# Patient Record
Sex: FEMALE | Race: WHITE | URBAN - METROPOLITAN AREA
[De-identification: names, ages, dates, MRNs, and addresses within clinical notes are randomized per-mention and may not be internally consistent; named-entity substitution may affect disease eponyms.]

---

## 2017-01-24 ENCOUNTER — TELEPHONE (OUTPATIENT)
Dept: FAMILY MEDICINE CLINIC | Age: 27
End: 2017-01-24

## 2017-01-24 NOTE — TELEPHONE ENCOUNTER
Pt wants to know if it is possible to have the flu without running a fever because she thinks me might have one. She says she is out of network.

## 2017-01-24 NOTE — TELEPHONE ENCOUNTER
Returned call to pt for clarification. States she has flu-like symptoms, but no fever. Pt asked for appt tmw either with PCP or Dr. Levert Opitz. Dr. Erik Murray out of office, Dr. Levert Opitz booked. Pt agreed to see NP to be tested for flu. States she is \"immune-compromised\" and just wanted to give heads-up. Advised pt's chart is visible to all providers in office for review.

## 2017-01-25 ENCOUNTER — OFFICE VISIT (OUTPATIENT)
Dept: FAMILY MEDICINE CLINIC | Age: 27
End: 2017-01-25

## 2017-01-25 VITALS
DIASTOLIC BLOOD PRESSURE: 74 MMHG | BODY MASS INDEX: 21.52 KG/M2 | OXYGEN SATURATION: 100 % | RESPIRATION RATE: 16 BRPM | TEMPERATURE: 96.6 F | HEART RATE: 89 BPM | WEIGHT: 142 LBS | HEIGHT: 68 IN | SYSTOLIC BLOOD PRESSURE: 119 MMHG

## 2017-01-25 DIAGNOSIS — B34.9 VIRAL SYNDROME: Primary | ICD-10-CM

## 2017-01-25 LAB
S PYO AG THROAT QL: NEGATIVE
VALID INTERNAL CONTROL?: YES

## 2017-01-25 RX ORDER — DOXYCYCLINE 100 MG/1
100 CAPSULE ORAL 2 TIMES DAILY
Qty: 20 CAP | Refills: 0 | Status: SHIPPED | OUTPATIENT
Start: 2017-01-25 | End: 2017-02-04

## 2017-01-25 RX ORDER — OSELTAMIVIR PHOSPHATE 75 MG/1
75 CAPSULE ORAL DAILY
Qty: 5 CAP | Refills: 0 | Status: SHIPPED | OUTPATIENT
Start: 2017-01-25 | End: 2017-01-30

## 2017-01-25 RX ORDER — FLUTICASONE FUROATE AND VILANTEROL 100; 25 UG/1; UG/1
1 POWDER RESPIRATORY (INHALATION) DAILY
COMMUNITY
End: 2017-12-01 | Stop reason: ALTCHOICE

## 2017-01-25 RX ORDER — SUMATRIPTAN 20 MG/1
1 SPRAY NASAL AS NEEDED
COMMUNITY
End: 2018-05-07 | Stop reason: SDUPTHER

## 2017-01-25 NOTE — MR AVS SNAPSHOT
Visit Information Date & Time Provider Department Dept. Phone Encounter #  
 1/25/2017  9:30 AM Yessica Hall, [facility] Gold [phone] [id number] Upcoming Health Maintenance Date Due  
 PAP AKA CERVICAL CYTOLOGY 4/19/2012 HPV AGE 9Y-26Y (2 of 3 - Female 3 Dose Series) 3/23/2016 Pneumococcal 19-64 Highest Risk (3 of 3 - PCV13) 8/22/2017 DTaP/Tdap/Td series (3 - Td) 10/21/2021 Allergies as of 1/25/2017  Review Complete On: 1/25/2017 By: Yessica Hall NP Severity Noted Reaction Type Reactions Amoxicillin High 04/01/2014   Intolerance Hives Levaquin [Levofloxacin] High 01/27/2016   Side Effect Seizures Seizure - lowered seizure threshold Sulfa (Sulfonamide Antibiotics)  10/20/2015    Other (comments) Vision and balance problem Current Immunizations  Reviewed on 12/9/2016 Name Date Hep A Vaccine (Adult) 5/24/2012, 10/21/2011 Hep B, Adol/Ped 4/20/1993, 10/15/1992 IPV 10/21/2011, 4/20/1995 Influenza Vaccine 10/20/2015 10:59 AM  
 Influenza Vaccine PF 10/29/2014  4:04 PM  
 Japanese Encephalitis Vaccine (SQ) 11/23/2011, 10/21/2011 MMR 4/20/1995, 7/20/1992 Meningococcal (MPSV4) Vaccine 11/15/2008 Pneumococcal Polysaccharide (PPSV-23) 8/22/2016, 7/1/2011 Td 10/7/2005 Tdap 10/21/2011 Typhoid Vi Capsular Polysaccharide Vaccine 10/21/2011 Not reviewed this visit You Were Diagnosed With   
  
 Codes Comments Viral syndrome    -  Primary ICD-10-CM: B34.9 ICD-9-CM: 079.99 Vitals BP Pulse Temp Resp Height(growth percentile) Weight(growth percentile) 119/74 (BP 1 Location: Left arm, BP Patient Position: Sitting) 89 96.6 °F (35.9 °C) (Oral) 16 5' 8\" (1.727 m) 142 lb (64.4 kg) LMP SpO2 BMI OB Status Smoking Status 01/11/2017 100% 21.59 kg/m2 Having regular periods Never Smoker BMI and BSA Data  Body Mass Index Body Surface Area  
 21.59 kg/m 2 1.76 m 2  
  
  
 Preferred Pharmacy Pharmacy Name Phone [facility] [phone]  [address] [phone] Your Updated Medication List  
  
   
This list is accurate as of: 1/25/17  9:59 AM.  Always use your most recent med list.  
  
  
  
  
 ADVAIR DISKUS 100-50 mcg/dose diskus inhaler Generic drug:  fluticasone-salmeterol Take 1 Puff by inhalation every twelve (12) hours. albuterol 2 mg tablet Commonly known as:  PROVENTIL Take 2 mg by mouth three (3) times daily. ALLER-MRASHALL PO Take  by mouth. BREO ELLIPTA 100-25 mcg/dose inhaler Generic drug:  fluticasone-vilanterol Take 1 Puff by inhalation daily. CALCIUM 600 + D 600-125 mg-unit Tab Generic drug:  calcium-cholecalciferol (d3) Take  by mouth. cholecalciferol 5,000 unit capsule Commonly known as:  VITAMIN D3 Take 1 Cap by mouth daily. doxycycline 100 mg capsule Commonly known as:  VIBRAMYCIN Take 1 Cap by mouth two (2) times a day for 10 days. folic acid 1 mg tablet Commonly known as:  Google Take  by mouth daily. * LaMICtal XR 50 mg Tr24 ER tablet Generic drug:  lamoTRIgine Take  by mouth. * LaMICtal  mg Tr24 ER tablet Generic drug:  lamoTRIgine Take  by mouth. ondansetron 8 mg disintegrating tablet Commonly known as:  ZOFRAN ODT Take 1 Tab by mouth every twelve (12) hours as needed for Nausea. oseltamivir 75 mg capsule Commonly known as:  TAMIFLU Take 1 Cap by mouth daily for 5 days. raNITIdine 75 mg tablet Commonly known as:  ZANTAC Take 75 mg by mouth two (2) times a day. SUMAtriptan 20 mg/actuation nasal spray Commonly known as:  IMITREX  
1 Spray as needed for Migraine. VIMPAT 200 mg Tab tablet Generic drug:  lacosamide Take 200 mg by mouth two (2) times a day. zonisamide 100 mg capsule Commonly known as:  Staci Hubbard  
 Take 200 mg by mouth nightly. * Notice: This list has 2 medication(s) that are the same as other medications prescribed for you. Read the directions carefully, and ask your doctor or other care provider to review them with you. Prescriptions Sent to Pharmacy Refills  
 oseltamivir (TAMIFLU) 75 mg capsule 0 Sig: Take 1 Cap by mouth daily for 5 days. Class: Normal  
 Pharmacy: [facility] [address] AT [address] [place] [address] Ph #: [phone] Route: Oral  
 doxycycline (VIBRAMYCIN) 100 mg capsule 0 Sig: Take 1 Cap by mouth two (2) times a day for 10 days. Class: Normal  
 Pharmacy: [facility] [address] AT [address] [place] [address] Ph #: [phone] Route: Oral  
  
Patient Instructions Oseltamivir (By mouth) Oseltamivir (kq-zse-QUV-i-vir) Treats and helps prevent influenza. Brand Name(s):Tamiflu There may be other brand names for this medicine. When This Medicine Should Not Be Used: This medicine is not right for everyone. Do not use it if you had an allergic reaction to oseltamivir. How to Use This Medicine:  
Capsule, Liquid · Your doctor will tell you how much medicine to use. Do not use more than directed. Do not take this medicine for any illness other than the flu. · Start taking this medicine as soon as possible after flu symptoms begin or after you are exposed to the flu (within the first 2 days). · Shake the oral liquid before each use. Measure the liquid medicine with the oral dispenser that came with the medicine. Ask your pharmacist for an oral measuring spoon or syringe if you do not have one. · You may open the capsule and mix the contents with a sweet liquid (such as chocolate syrup, corn syrup, or sugar dissolved in water). Ask your doctor or pharmacist if you have any questions. · Read and follow the patient instructions that come with this medicine. Talk to your doctor or pharmacist if you have any questions. · Missed dose: If you miss a dose and your next dose is due within 2 hours, skip the missed dose and take your medicine at the normal time. Do not use extra medicine to make up for a missed dose. If you miss a dose and your next dose is due in more than 2 hours, take the missed dose as soon as you can. Then take your next dose at the normal time, and go back to your regular schedule. · Capsules: Store at room temperature, away from heat, moisture, and direct light. · Oral liquid: Store in the refrigerator and use within 17 days. Do not freeze. You may also store the medicine at room temperature, but use it within 10 days. Throw away any medicine that has not been used within this time. Drugs and Foods to Avoid: Ask your doctor or pharmacist before using any other medicine, including over-the-counter medicines, vitamins, and herbal products. · Do not use this medicine if you have received the live influenza vaccine (nasal mist) in the past 2 weeks or if you will receive the vaccine within 48 hours, unless your doctor says it is okay. Warnings While Using This Medicine: · Tell your doctor if you are pregnant or breastfeeding, or if you have kidney disease, liver disease, heart disease, lung disease, or a weakened immune system. · This medicine may cause the following problems:  
¨ Serious skin reactions ¨ Unusual thoughts or behaviors · Call your doctor if your symptoms do not improve or if they get worse. · The liquid form of this medicine contains sorbitol. Tell your doctor if you have hereditary fructose intolerance. · This medicine is not a substitute for a yearly flu shot. It also will not prevent a bacterial infection. · Keep all medicine out of the reach of children. Never share your medicine with anyone. Possible Side Effects While Using This Medicine: Call your doctor right away if you notice any of these side effects: · Allergic reaction: Itching or hives, swelling in your face or hands, swelling or tingling in your mouth or throat, chest tightness, trouble breathing · Blistering, peeling, red skin rash · Confusion, agitation, seeing or hearing things that are not there, change in mood or behavior, seizures · Fever, chills, cough, sore throat, body aches If you notice these less serious side effects, talk with your doctor: · Nausea, vomiting, diarrhea, stomach pain, or upset stomach If you notice other side effects that you think are caused by this medicine, tell your doctor. Call your doctor for medical advice about side effects. You may report side effects to FDA at 4-926-RPB-2932 © 2016 [address] is for End User's use only and may not be sold, redistributed or otherwise used for commercial purposes. The above information is an  only. It is not intended as medical advice for individual conditions or treatments. Talk to your doctor, nurse or pharmacist before following any medical regimen to see if it is safe and effective for you. Introducing [facility]! Dear Haley Merino: Thank you for requesting a [employer] account. Our records indicate that you already have an active [employer] account. You can access your account anytime at [email or web address] Did you know that you can access your hospital and ER discharge instructions at any time in [employer]? You can also review all of your test results from your hospital stay or ER visit. Additional Information If you have questions, please visit the Frequently Asked Questions section of the [employer] website at [email or web address] Remember, [employer] is NOT to be used for urgent needs. For medical emergencies, dial 911. Now available from your iPhone and Android! Please provide this summary of care documentation to your next provider. Your primary care clinician is listed as Cesar Dumont. If you have any questions after today's visit, please call [phone].

## 2017-01-25 NOTE — PROGRESS NOTES
HISTORY OF PRESENT ILLNESS  Andrea Cummings is a 23 y.o. female. HPI Acute onset of nausea with vomiting once, marked body aches and pain, extreme fatigue and decreased appetite. No treatment to date. Symptom onset acute, timing constant, pain mild to moderate. Past Medical History   Diagnosis Date    Acne vulgaris      Currently on 2nd course of Acutane, sees Dr. 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 [place] [address] 9years old     Absence seizures originally, developed tonic-clonic seizures in 2007; Followed by Dr. Harish Acosta        Current Outpatient Prescriptions:     SUMAtriptan (IMITREX) 20 mg/actuation nasal spray, 1 Spray as needed for Migraine. , Disp: , Rfl:     fluticasone-vilanterol (BREO ELLIPTA) 100-25 mcg/dose inhaler, Take 1 Puff by inhalation daily. , Disp: , Rfl:     oseltamivir (TAMIFLU) 75 mg capsule, Take 1 Cap by mouth daily for 5 days. , Disp: 5 Cap, Rfl: 0    doxycycline (VIBRAMYCIN) 100 mg capsule, Take 1 Cap by mouth two (2) times a day for 10 days. , Disp: 20 Cap, Rfl: 0    ondansetron (ZOFRAN ODT) 8 mg disintegrating tablet, Take 1 Tab by mouth every twelve (12) hours as needed for Nausea., Disp: 20 Tab, Rfl: 4    cholecalciferol (VITAMIN D3) 5,000 unit capsule, Take 1 Cap by mouth daily. , Disp: 90 Cap, Rfl: 3    zonisamide (ZONEGRAN) 100 mg capsule, Take 200 mg by mouth nightly., Disp: , Rfl:     lacosamide (VIMPAT) 200 mg tab tablet, Take 200 mg by mouth two (2) times a day., Disp: , Rfl:     CETIRIZINE HCL (ALLER-MARSHALL PO), Take  by mouth., Disp: , Rfl:     ranitidine (ZANTAC) 75 mg tablet, Take 75 mg by mouth two (2) times a day., Disp: , Rfl:     calcium-cholecalciferol, d3, (CALCIUM 600 + D) 600-125 mg-unit tab, Take  by mouth., Disp: , Rfl:     folic acid (FOLVITE) 1 mg tablet, Take  by mouth daily. , Disp: , Rfl:     albuterol (PROVENTIL) 2 mg tablet, Take 2 mg by mouth three (3) times daily. , Disp: , Rfl:     Lamotrigine (LAMICTAL XR) 50 mg tr24, Take  by mouth., Disp: , Rfl:     Lamotrigine (LAMICTAL XR) 200 mg tr24, Take  by mouth., Disp: , Rfl:     fluticasone-salmeterol (ADVAIR DISKUS) 100-50 mcg/dose diskus inhaler, Take 1 Puff by inhalation every twelve (12) hours. , Disp: , Rfl:     Review of Systems   Constitutional: Positive for malaise/fatigue. Negative for diaphoresis and fever. HENT: Positive for congestion. Negative for ear discharge, ear pain and sore throat. Eyes: Negative. Negative for pain, discharge and redness. Respiratory: Negative. Negative for cough, hemoptysis, sputum production, shortness of breath, wheezing and stridor. Cardiovascular: Negative. Negative for chest pain, palpitations, orthopnea, claudication, leg swelling and PND. Gastrointestinal: Positive for nausea and vomiting. Negative for abdominal pain and heartburn. Musculoskeletal: Positive for myalgias. Negative for back pain, joint pain and neck pain. Skin: Negative. Negative for itching and rash. Neurological: Positive for headaches. Negative for dizziness, sensory change and focal weakness. Physical Exam   Constitutional: She is oriented to person, place, and time. She appears well-developed and well-nourished. No distress. HENT:   Head: Normocephalic and atraumatic. Right Ear: External ear normal.   Left Ear: External ear normal.   Nose: Nose normal.   Mouth/Throat: Oropharynx is clear and moist. No oropharyngeal exudate. Eyes: Conjunctivae and EOM are normal. Pupils are equal, round, and reactive to light. Right eye exhibits no discharge. Left eye exhibits no discharge. No scleral icterus. Neck: Neck supple. No tracheal deviation present. No thyromegaly present.    Cardiovascular: Normal rate, regular rhythm, normal heart sounds and intact distal pulses. Exam reveals no gallop and no friction rub. No murmur heard. Pulmonary/Chest: Effort normal and breath sounds normal. No stridor. No respiratory distress. She has no wheezes. She has no rales. Abdominal: Soft. Bowel sounds are normal. She exhibits no distension. There is no tenderness. There is no rebound. Musculoskeletal: She exhibits no edema, tenderness or deformity. Lymphadenopathy:     She has no cervical adenopathy. Neurological: She is alert and oriented to person, place, and time. No cranial nerve deficit. She exhibits normal muscle tone. Coordination normal.   Skin: Skin is warm and dry. No rash noted. She is not diaphoretic. No erythema. There is pallor. Psychiatric: She has a normal mood and affect. Her behavior is normal. Judgment and thought content normal.   Nursing note and vitals reviewed. Rapid strep; negative    ASSESSMENT and PLAN  1. Acute viral syndrome:   -Tamiflu 75 mg daily x 5 days. If bacterial component develops a prescription for Doxycycline 100 mg BID x 10 days provided to the patient. Bacterial URI symptoms reviewed in detail with the patient. Patient is in agreement with the treatment plan. Return to the clinic if needed. All questions answered and discharge instructions reviewed with the patient.

## 2017-01-25 NOTE — TELEPHONE ENCOUNTER
Routing to [facility]. Please ensure pt is wearing mask for appt. Pt is very compliant and usually is already wearing one. Thanks Sergio Arenas!

## 2017-01-25 NOTE — PROGRESS NOTES
Mary Logan is a 23 y.o. female here today for chills ,bodyaches runny nose started Sunday. 1. Have you been to the ER, urgent care clinic or hospitalized since your last visit? NO.     2. Have you seen or consulted any other health care providers outside of the [address] [place] [address] since your last visit (Include any pap smears or colon screening)? NO      Do you have an Advanced Directive? NO    Would you like information on Advanced Directives?  NO    Learning Assessment 1/23/2015   PRIMARY LEARNER Patient   HIGHEST LEVEL OF EDUCATION - PRIMARY LEARNER  > 4 YEARS OF COLLEGE   BARRIERS PRIMARY LEARNER NONE   PRIMARY LANGUAGE ENGLISH   LEARNER PREFERENCE PRIMARY OTHER (COMMENT)   ANSWERED BY patient   RELATIONSHIP SELF

## 2017-01-25 NOTE — PATIENT INSTRUCTIONS
Oseltamivir (By mouth)   Oseltamivir (sz-vjl-AIM-i-vir)  Treats and helps prevent influenza. Brand Name(s):Tamiflu   There may be other brand names for this medicine. When This Medicine Should Not Be Used: This medicine is not right for everyone. Do not use it if you had an allergic reaction to oseltamivir. How to Use This Medicine:   Capsule, Liquid  · Your doctor will tell you how much medicine to use. Do not use more than directed. Do not take this medicine for any illness other than the flu. · Start taking this medicine as soon as possible after flu symptoms begin or after you are exposed to the flu (within the first 2 days). · Shake the oral liquid before each use. Measure the liquid medicine with the oral dispenser that came with the medicine. Ask your pharmacist for an oral measuring spoon or syringe if you do not have one. · You may open the capsule and mix the contents with a sweet liquid (such as chocolate syrup, corn syrup, or sugar dissolved in water). Ask your doctor or pharmacist if you have any questions. · Read and follow the patient instructions that come with this medicine. Talk to your doctor or pharmacist if you have any questions. · Missed dose: If you miss a dose and your next dose is due within 2 hours, skip the missed dose and take your medicine at the normal time. Do not use extra medicine to make up for a missed dose. If you miss a dose and your next dose is due in more than 2 hours, take the missed dose as soon as you can. Then take your next dose at the normal time, and go back to your regular schedule. · Capsules: Store at room temperature, away from heat, moisture, and direct light. · Oral liquid: Store in the refrigerator and use within 17 days. Do not freeze. You may also store the medicine at room temperature, but use it within 10 days. Throw away any medicine that has not been used within this time.   Drugs and Foods to Avoid:   Ask your doctor or pharmacist before using any other medicine, including over-the-counter medicines, vitamins, and herbal products. · Do not use this medicine if you have received the live influenza vaccine (nasal mist) in the past 2 weeks or if you will receive the vaccine within 48 hours, unless your doctor says it is okay. Warnings While Using This Medicine:   · Tell your doctor if you are pregnant or breastfeeding, or if you have kidney disease, liver disease, heart disease, lung disease, or a weakened immune system. · This medicine may cause the following problems:   ¨ Serious skin reactions  ¨ Unusual thoughts or behaviors  · Call your doctor if your symptoms do not improve or if they get worse. · The liquid form of this medicine contains sorbitol. Tell your doctor if you have hereditary fructose intolerance. · This medicine is not a substitute for a yearly flu shot. It also will not prevent a bacterial infection. · Keep all medicine out of the reach of children. Never share your medicine with anyone. Possible Side Effects While Using This Medicine:   Call your doctor right away if you notice any of these side effects:  · Allergic reaction: Itching or hives, swelling in your face or hands, swelling or tingling in your mouth or throat, chest tightness, trouble breathing  · Blistering, peeling, red skin rash  · Confusion, agitation, seeing or hearing things that are not there, change in mood or behavior, seizures  · Fever, chills, cough, sore throat, body aches  If you notice these less serious side effects, talk with your doctor:   · Nausea, vomiting, diarrhea, stomach pain, or upset stomach  If you notice other side effects that you think are caused by this medicine, tell your doctor. Call your doctor for medical advice about side effects.  You may report side effects to FDA at 2-135-FDA-1110  © 2016 [address] is for End User's use only and may not be sold, redistributed or otherwise used for commercial purposes. The above information is an  only. It is not intended as medical advice for individual conditions or treatments. Talk to your doctor, nurse or pharmacist before following any medical regimen to see if it is safe and effective for you.

## 2017-02-13 NOTE — PROGRESS NOTES
PRE-VISIT PLANNING:     Future Appointments  Date Time Provider Gera Griffin   2017 9:00 AM Uche Enriquez MD [facility]          Uche Enriquez is a patient of Uche Enriquez MD who is scheduled for an office visit with Uche Enriquez MD on 2017 for cough and congestion. Encounter opened prior to visit to complete pre-visit planning, update and review pertinent sections in the chart. Last office visit with PCP was 2016. Seen by JAMAICA Mariscal for flu like symptoms on 17, treated with Tamiflu and given script for doxy in case of bacterial secondary infection. Internal [id number] [facility] [place] [address] Don [facility], [address] [facility] , [address] [place] [address]  Phone [phone]  Fax [phone]    Date of Service:  2017  Patient's Name: Uche Enriquez   Patient's :  1990     Subjective/Discussion        Chief Complaint   Patient presents with   Jordan Filler Symptoms        Uche Enriquez is a very pleasant 23 y.o. female patient of Uche Enriquez MD who was seen today for follow up after recent flu-like symptoms, flu testing wasn't done, but rapid strep was negative. She was treated with tamiflu (17). She notes tamiflu did help and she felt like she improved, but hasn't made it back to baseline yet. Still sleeping a lot, still has sore throat, rare cough, feels tired and a little unfocused. She also developed a \"purple spot\" on her left back about a week ago, mildly itchy, was peeling initially, hasn't spread at all. Hx of immune deficiency followed by immunology, susceptible to URIs. She notes recent IGA levels were stable. Also hx of epilepsy followed by neuro and FQ use resulted in seizure 2 years ago. Recent episode of flu like symptoms. Seizures are usually controlled on meds with occasional seizure if she misses meds.   She notes that she has not missed any meds recently, but she had a seizure Tuesday late at night in spite of being compliant with seizure medications. Eating and drinking normally. It was the first day of her period when she had seizure    She also notes that her premenstrual symptoms have been worse lately. Has been getting increasingly severe migraines (without aura) 1-3 days prior to menstrual cycle and is interested in going back on OCP to help with this. Her pediatric neurologist used to prescribe an OCP for her and she is familiar with skipping placebos and planning for 4 withdrawal bleeds per year. She had Pap smear with TPW in 2014.   She has scheduled appt with Dr. Carina Holliday this week and agrees she will not fill script for OCP without his express approval.      Review of Systems (positives in bold)   CONST:    fevers, chills, rigors, malaise, appetite change  NEURO:   headaches, vision changes, focal weakness, new gait difficulty/imbalance, seizures,    dizziness, lightheadeness, orthostasis, loss of consciousness, confusion  HEENT:  itchy eyes, runny eyes, red eyes, eye watering or discharge,     vision changes, light sensitivity, double vision, eye pain,     ear pain, ear pressure/popping, ear discharge/drainage, hearing change    sneezing, runny nose, nasal congestion, stuffy nose, sinus pressure/pain,     postnasal drip, altered sense of smell, nosebleeds,     sore throat, difficulty swallowing, voice change, hoarse voice,      jaw pain, toothache, dysgeusia,     oral ulcers or canker sores  CV:      chest pain, chest wall pain, palpitations, chest wall pain, orthopnea, edema  PULM:  SOB, wheezing, cough, sputum production, hemoptysis  GI:             nausea, vomiting, dry heaves, abdominal pain,     diarrhea, constipation, greasy stools, blood in stool, pale stools  SKIN:        rashes, itching, vesicles, pustules, drainage, cuts/sores, nonhealing wounds      Exam:   VS:    Visit Vitals    /82 (BP 1 Location: Left arm, BP Patient Position: Sitting)    Pulse 80    Temp 97.8 °F (36.6 °C) (Oral)    Resp 16    Ht 5' 8\" (1.727 m)    Wt 144 lb 12.8 oz (65.7 kg)    LMP 02/07/2017    SpO2 99%    BMI 22.02 kg/m2       General:   Pleasant age appropriate young female, slender, appears to be feeling well, well-groomed, conversant, alert, in no acute distress. Head:  Normocephalic, atraumatic  Ears:  External ears WNL, no pain with movement of pinna, no TTP of mastoid processes    TMs WNL bilaterally with no bulging, or erythema, no medial effusions present  Eyes:  EOMI, PERRL, no pain with eye movements    No conjunctival injection, abnormal tearing, discharge, chemosis  Mouth: MMM, o/p with posterior erythema and mild clear drainage, otw WNL without membranes, exudates, ulcerations or petechiae  Nose:  External nares WNL  Neck:  Neck supple with normal ROM for age, no thyromegaly, no LAD  Cardiovasc:   Regular rate and rhythm, no murmurs, no rubs, no gallops  Pulmonary:   Clear breath sounds bilaterally, good air movement,    No wheezing, no rales, no rhonchi, normal respiratory effort  Abdomen:   Abdomen soft, nontender, nondistended, NABS, no masses  Extremities:   No dependent edema, no tenderness with palpation of calves,     Warm and well-perfused at distal extremities  Skin:    Single oval brown hyperpigmented macule on left thoracic back with fine collarette of scale     Encounter Diagnoses:     Encounter Diagnoses     ICD-10-CM ICD-9-CM   1. Malaise and fatigue R53.81 780.79    R53.83    2. Seizures ([facility] R56.9 780.39   3. IgA deficiency ([facility] D80.2 279.01   4. Dermatitis L30.9 692.9   5. Migraine without aura and without status migrainosus, not intractable G43.009 346.10       Assessment/Plan:       Amparo Esquivel was seen today for persistent malaise, fatigue, sore throat, mild cough after flu like illness 3 weeks ago. Clinical picture complicated by recent breakthrough seizure in spite of med compliance.   There are some reports of tamiflu lowering seizure threshold, but seizure didn't occur until 15 days after completion of Tamiflu. Will check with clinical pharmacist, but I'm not sure it's reasonable to attribute breakthrough seizure to tamiflu. Labs today (at risk for infection due to IGA deficiency), some lingering symptoms may simply be part of recovery from flu. Desma Pickle patch on left back nonspecific in appearance, try lotrisone, but I reviewed with patient that this possibly could be a herald patch and may develop into a more widespread rash (pityriasis rosea) which is not contagious and has no specific treatment. She'll keep me updated on new symptoms. Migraines related to menstrual cycle could be treated with desogen (skipping placebos), but she will need to discuss with her neurologist before starting the medication. ICD-10-CM ICD-9-CM    1. Malaise and fatigue R53.81 780.79 CBC WITH AUTOMATED DIFF    W87.28  METABOLIC PANEL, COMPREHENSIVE      MONO SCREEN W/ REFLX EBV      TSH AND FREE T4      CBC WITH AUTOMATED DIFF      METABOLIC PANEL, COMPREHENSIVE      MONO SCREEN W/ REFLX EBV      TSH AND FREE T4   2. Seizures (HCC) R56.9 780.39 CBC WITH AUTOMATED DIFF      METABOLIC PANEL, COMPREHENSIVE      TSH AND FREE T4      CBC WITH AUTOMATED DIFF      METABOLIC PANEL, COMPREHENSIVE      TSH AND FREE T4   3. IgA deficiency ([facility] D80.2 279.01 CBC WITH AUTOMATED DIFF      METABOLIC PANEL, COMPREHENSIVE      MONO SCREEN W/ REFLX EBV      CBC WITH AUTOMATED DIFF      METABOLIC PANEL, COMPREHENSIVE      MONO SCREEN W/ REFLX EBV   4. Dermatitis L30.9 692.9    5. Migraine without aura and without status migrainosus, not intractable G43.009 346.10 desogestrel-ethinyl estradiol (DESOGEN) 0.15-0.03 mg tab       The patient states understanding and agrees with the treatment plan. All questions were answered.   Total visit time today = 43 minutes with greater than half of that time spent in face to face discussion and counseling with patient (or caregiver) regarding patient concerns and symptoms, possible causes, plan for evaluation, alarm signs and symptoms that would necessitate emergent evaluation by a licensed healthcare professional.  Also discussed with patient available treatment options and associated risks/benefits, proper medication administration, and recommended lifestyle (diet/activity) changes. Arlen Knutson MD - Internal Medicine  2/14/2017, 6:02 PM    Patient Care Team:  Arlen Knutson MD as PCP - General (Internal Medicine)  Italia Winters MD as Consulting Provider (Neurology)        Additional History     Past Medical History   Diagnosis Date    Acne vulgaris      Currently on 2nd course of Acutane, sees Dr. Garry Espinoza Asthma      Exercise-induced asthma, may have been from chronic bronchitis; doing well on Advair     IgA deficiency [facility])      Initially thought to be secondary to Carbatrol; followed by pediatric allergy/immunology through [facility], has not resolved with med change; every 5 year PPSV23    Migraine without aura and without status migrainosus, not intractable 2/14/2017    Seizures ([facility] 9years old     Absence seizures originally, developed tonic-clonic seizures in 2007; Followed by Dr. Keegan Garzon      Past Surgical History   Procedure Laterality Date    Hx wisdom teeth extraction  2009     Social History   Substance Use Topics    Smoking status: Never Smoker    Smokeless tobacco: Never Used    Alcohol use Yes      Comment: 1/wk     Current Outpatient Prescriptions   Medication Sig    clotrimazole-betamethasone (LOTRISONE) topical cream Apply a thin layer twice daily to the itchy spot on your left back.  desogestrel-ethinyl estradiol (DESOGEN) 0.15-0.03 mg tab Take 1 Tab by mouth daily. Do not start without approval from Neurologist.  Seizure meds may need adjustment. Skip placebo pills.  SUMAtriptan (IMITREX) 20 mg/actuation nasal spray 1 Spray as needed for Migraine.  fluticasone-vilanterol (BREO ELLIPTA) 100-25 mcg/dose inhaler Take 1 Puff by inhalation daily.  ondansetron (ZOFRAN ODT) 8 mg disintegrating tablet Take 1 Tab by mouth every twelve (12) hours as needed for Nausea.  cholecalciferol (VITAMIN D3) 5,000 unit capsule Take 1 Cap by mouth daily.  zonisamide (ZONEGRAN) 100 mg capsule Take 200 mg by mouth nightly.  lacosamide (VIMPAT) 200 mg tab tablet Take 200 mg by mouth two (2) times a day.  CETIRIZINE HCL (ALLER-MARSHALL PO) Take  by mouth.  ranitidine (ZANTAC) 75 mg tablet Take 75 mg by mouth two (2) times a day.  calcium-cholecalciferol, d3, (CALCIUM 600 + D) 600-125 mg-unit tab Take  by mouth.  folic acid (FOLVITE) 1 mg tablet Take  by mouth daily.  fluticasone-salmeterol (ADVAIR DISKUS) 100-50 mcg/dose diskus inhaler Take 1 Puff by inhalation every twelve (12) hours.  albuterol (PROVENTIL) 2 mg tablet Take 2 mg by mouth three (3) times daily.  Lamotrigine (LAMICTAL XR) 50 mg tr24 Take  by mouth.  Lamotrigine (LAMICTAL XR) 200 mg tr24 Take  by mouth. No current facility-administered medications for this visit. Allergies   Allergen Reactions    Amoxicillin Hives    Levaquin [Levofloxacin] Seizures     Seizure - lowered seizure threshold    Sulfa (Sulfonamide Antibiotics) Other (comments)     Vision and balance problem            This document may have been created with the aid of dictation software. Text may contain errors, particularly phonetic errors.

## 2017-02-14 ENCOUNTER — OFFICE VISIT (OUTPATIENT)
Dept: FAMILY MEDICINE CLINIC | Age: 27
End: 2017-02-14

## 2017-02-14 VITALS
DIASTOLIC BLOOD PRESSURE: 82 MMHG | HEART RATE: 80 BPM | SYSTOLIC BLOOD PRESSURE: 126 MMHG | HEIGHT: 68 IN | RESPIRATION RATE: 16 BRPM | TEMPERATURE: 97.8 F | BODY MASS INDEX: 21.95 KG/M2 | OXYGEN SATURATION: 99 % | WEIGHT: 144.8 LBS

## 2017-02-14 DIAGNOSIS — L30.9 DERMATITIS: ICD-10-CM

## 2017-02-14 DIAGNOSIS — G43.009 MIGRAINE WITHOUT AURA AND WITHOUT STATUS MIGRAINOSUS, NOT INTRACTABLE: ICD-10-CM

## 2017-02-14 DIAGNOSIS — R53.83 MALAISE AND FATIGUE: Primary | ICD-10-CM

## 2017-02-14 DIAGNOSIS — R53.81 MALAISE AND FATIGUE: Primary | ICD-10-CM

## 2017-02-14 DIAGNOSIS — R56.9 SEIZURES (HCC): Chronic | ICD-10-CM

## 2017-02-14 DIAGNOSIS — D80.2 IGA DEFICIENCY (HCC): Chronic | ICD-10-CM

## 2017-02-14 RX ORDER — DESOGESTREL AND ETHINYL ESTRADIOL 0.15-0.03
1 KIT ORAL DAILY
Qty: 4 PACKET | Refills: 3 | Status: SHIPPED | OUTPATIENT
Start: 2017-02-14 | End: 2017-05-25 | Stop reason: ALTCHOICE

## 2017-02-14 RX ORDER — CLOTRIMAZOLE AND BETAMETHASONE DIPROPIONATE 10; .64 MG/G; MG/G
CREAM TOPICAL
Qty: 15 G | Refills: 0 | Status: SHIPPED | OUTPATIENT
Start: 2017-02-14 | End: 2017-05-25 | Stop reason: ALTCHOICE

## 2017-02-14 NOTE — LETTER
February 14, 2017 [facility] [address] [place] [address] Dear Elie Ramírez: Thank you for requesting access to [facility]. Please follow the instructions below to securely access and download your online medical record. [facility] allows you to send messages to your doctor, view your test results, renew your prescriptions, schedule appointments, and more. How Do I Sign Up? 1. In your internet browser, go to [email or web address]. 2. Click on the First Time User? Click Here link in the Sign In box. You will see the New Member Sign Up page. 3. Enter your [facility] Access Code exactly as it appears below. You will not need to use this code after youve completed the sign-up process. If you do not sign up before the expiration date, you must request a new code. [facility] Access Code: [email or web address] Expires: 5/15/2017  8:56 AM  
 
4. Enter the last four digits of your Social Security Number (xxxx) and Date of Birth (mm/dd/yyyy) as indicated and click Submit. You will be taken to the next sign-up page. 5. Create a [facility] ID. This will be your [facility] login ID and cannot be changed, so think of one that is secure and easy to remember. 6. Create a [facility] password. You can change your password at any time. 7. Enter your Password Reset Question and Answer. This can be used at a later time if you forget your password. 8. Enter your e-mail address. You will receive e-mail notification when new information is available in [address] 9. Click Sign Up. You can now view and download portions of your medical record. 10. Click the Download Summary menu link to download a portable copy of your medical information. Additional Information If you have questions, please visit the Frequently Asked Questions section of the [facility] website at [email or web address] Remember, [facility] is NOT to be used for urgent needs. For medical emergencies, dial 911. Now available from your iPhone and Android! Sincerely, [employer]

## 2017-02-14 NOTE — PROGRESS NOTES
Almon Dakin is a 23 y.o. female  Pt here today for cold symptoms. 1. Have you been to the ER, urgent care clinic since your last visit? Hospitalized since your last visit? No    2. Have you seen or consulted any other health care providers outside of the [address] [place] [address] since your last visit? Include any pap smears or colon screening.  No

## 2017-02-14 NOTE — PATIENT INSTRUCTIONS
STAY UP TO DATE WITH YOUR PREVENTIVE HEALTH:     Please review the following recommendations and if you are not sure that your healthcare is up to date, ask your provider at your next scheduled office visit. -- Yearly preventive visits (sometimes called \"physicals\") are recommended for all adults. Medicare and Medicaid do not pay for physicals. Medicare covers a yearly wellness visit that differs in many ways from a traditional physical, but is an opportunity to make sure you are maximizing the preventive services that will be covered by Medicare. Each insurance carrier will have different regulations about what services may be covered, so be sure to talk with your insurance provider before scheduling a visit. -- Colon cancer screening is recommended for all patients 48 and older with either colonoscopy (interval will vary depending on results) or yearly stool testing.      -- Mammogram is recommended every 2 years for women ages 36 to 76. -- Pap smear is recommended every 3-5 years for women aged 24 to 72, unless your cervix has been surgically removed for benign reasons. -- Flu shot is recommended every fall for adults of all ages. -- Prevnar 15 is recommended at the age of 72 for all adults. -- Pneumovax is recommended one year after Prevnar 13 for all adults, but earlier if you have a history of chronic health problems (including diabetes and asthma) or smoking. -- Tetanus boosters are recommended every 10 years for adults of all ages. Medicare and Medicaid do not cover tetanus as a preventive booster, but will pay for patients to receive a booster in the event of certain injuries. INSTRUCTIONS AFTER YOUR VISIT ON 2/14/2017     -- Try the topical cream on your \"purple spot\". It may be an early sign of something called pityriasis rosea (see the attached info). LAB WORK was ordered for today. Sign in for the lab at the .    Results are generally reviewed within 10-14 days.     LAB RESULTS can be obtained by logging into your [employer] account. If you need assistance with accessing your account, you can call the [address] [place] [address] at #[phone].    -- Check with Dr. Patti Gloria before starting oral contraceptive. I would plan to have you skip placebo pills for 3 months at a time (so you have a bleeding cycle 4 times yearly) which should help keep levels stable    TESTING RESULTS   -- Lab results may become available for your review on [place]ion before your provider has an opportunity to write you a note about results. Review of routine lab results will generally be done in 10-14 days. Please save your inquiries about results until your provider has had time to review them. -- If you have testing done outside of the office, please call to let us know the date and location that your testing was completed. Results can often be obtained faster if an inquiry is run. MEDICATION REFILLS      -- Controlled substance refills (medications that require printed prescriptions for monitoring of use per [facility] guidelines) must be picked up in the office and per medical group policy will require a scheduled office visit with the provider. Calling the after hours answering service to request a controlled substance represents a violation of [facility] controlled substance policy. -- All other medication refills are to be requested by calling your pharmacy during regular office hours. The after hours physician on call is not required to respond to calls about medication refills. It is your responsibility to keep track of when you may be running out of medication and to place refill requests at least 3 business days prior. [facility]      Scheduling appointments can be done at the  or by calling [phone] and selecting option #1.       HOLIDAY CLOSURES:     The office is closed on six major holidays each year:  New Year's Day, July 4th, Memorial Day, Labor Day, Thanksgiving, and [place] Day. Lab and X-ray services are not available on those days. Pityriasis Rosea: Care Instructions  Your Care Instructions  Pityriasis rosea is a common skin rash. It usually starts as one scaly, reddish-pink spot on your stomach or back. Days or weeks later, more spots appear. The rash may itch, but it will not spread to other people. No one knows what causes pityriasis rosea. Some doctors believe it is a reaction to a virus. Pityriasis rosea is most common in children and young adults. It lasts 1 to 3 months and then goes away on its own. Medicine can help relieve any itching. Follow-up care is a key part of your treatment and safety. Be sure to make and go to all appointments, and call your doctor if you are having problems. It's also a good idea to know your test results and keep a list of the medicines you take. How can you care for yourself at home? · Use your medicine exactly as prescribed. Call your doctor if you have any problems with your medicine. · Expose your skin to small amounts of sunlight, but avoid sunburn. Sunlight can lessen the rash. · Use a mild soap, such as Dove or Cetaphil, when you wash your skin. · Add a handful of oatmeal (ground to a powder) to your bath. Or you can try an oatmeal bath product, such as Aveeno. Keep the water warm or lukewarm. A hot bath or shower may make the rash more visible and itchy. · Use an over-the-counter 1% hydrocortisone cream for small itchy areas. When should you call for help? Call your doctor now or seek immediate medical care if:  · Your whole body itches, but you have no obvious rash or other cause. · Your itching is so bad that you cannot sleep. · Your home treatment does not help. · Your skin is badly broken from scratching. · You have signs of infection such as:  ¨ Pain, warmth, or swelling near the rash. ¨ Red streaks near the rash.   ¨ Pus coming from the rash. ¨ A fever. · You see the rash on the palms of your hands or the soles of your feet. Watch closely for changes in your health, and be sure to contact your doctor if:  · You do not get better as expected. Where can you learn more? Go to http://dayna-emery.info/. Enter S327 in the search box to learn more about \"Pityriasis Rosea: Care Instructions. \"  Current as of: February 5, 2016  Content Version: 11.1  © 2498-3669 [employer]. Care instructions adapted under license by [employer] (which disclaims liability or warranty for this information). If you have questions about a medical condition or this instruction, always ask your healthcare professional. [address] any warranty or liability for your use of this information.

## 2017-02-14 NOTE — MR AVS SNAPSHOT
Visit Information Date & Time Provider Department Dept. Phone Encounter #  
 2/14/2017  9:00 AM Marybeth De Jesus, 3 [facility] [phone] [id number] Upcoming Health Maintenance Date Due  
 PAP AKA CERVICAL CYTOLOGY 4/19/2012 HPV AGE 9Y-26Y (2 of 3 - Female 3 Dose Series) 3/23/2016 Pneumococcal 19-64 Highest Risk (3 of 3 - PCV13) 8/22/2017 DTaP/Tdap/Td series (3 - Td) 10/21/2021 Allergies as of 2/14/2017  Review Complete On: 2/14/2017 By: Marybeth De Jesus MD  
  
 Severity Noted Reaction Type Reactions Amoxicillin High 04/01/2014   Intolerance Hives Levaquin [Levofloxacin] High 01/27/2016   Side Effect Seizures Seizure - lowered seizure threshold Sulfa (Sulfonamide Antibiotics)  10/20/2015    Other (comments) Vision and balance problem Current Immunizations  Reviewed on 12/9/2016 Name Date Hep A Vaccine (Adult) 5/24/2012, 10/21/2011 Hep B, Adol/Ped 4/20/1993, 10/15/1992 IPV 10/21/2011, 4/20/1995 Influenza Vaccine 10/20/2015 10:59 AM  
 Influenza Vaccine PF 10/29/2014  4:04 PM  
 Japanese Encephalitis Vaccine (SQ) 11/23/2011, 10/21/2011 MMR 4/20/1995, 7/20/1992 Meningococcal (MPSV4) Vaccine 11/15/2008 Pneumococcal Polysaccharide (PPSV-23) 8/22/2016, 7/1/2011 Td 10/7/2005 Tdap 10/21/2011 Typhoid Vi Capsular Polysaccharide Vaccine 10/21/2011 Not reviewed this visit You Were Diagnosed With   
  
 Codes Comments Malaise and fatigue    -  Primary ICD-10-CM: R53.81, R53.83 ICD-9-CM: 780.79 Seizures ([facility] [facility]     ICD-10-CM: R56.9 ICD-9-CM: 780.39 [facility] ([facility]     ICD-10-CM: D80.2 ICD-9-CM: 279.01 Dermatitis     ICD-10-CM: L30.9 ICD-9-CM: 692.9 Migraine without aura and without status migrainosus, not intractable     ICD-10-CM: J60.994 ICD-9-CM: 346.10 Vitals BP Pulse Temp Resp Height(growth percentile) Weight(growth percentile) (!) 82/70 69 97.8 °F (36.6 °C) (Oral) 16 5' 8\" (1.727 m) 144 lb 12.8 oz (65.7 kg) LMP SpO2 BMI OB Status Smoking Status 02/07/2017 99% 22.02 kg/m2 Having regular periods Never Smoker Vitals History BMI and BSA Data Body Mass Index Body Surface Area 22.02 kg/m 2 1.78 m 2 Preferred Pharmacy Pharmacy Name Phone [facility] [phone] [phone] Your Updated Medication List  
  
   
This list is accurate as of: 2/14/17  9:41 AM.  Always use your most recent med list.  
  
  
  
  
 ADVAIR DISKUS 100-50 mcg/dose diskus inhaler Generic drug:  fluticasone-salmeterol Take 1 Puff by inhalation every twelve (12) hours. albuterol 2 mg tablet Commonly known as:  PROVENTIL Take 2 mg by mouth three (3) times daily. ALLER-MARSHALL PO Take  by mouth. BREO ELLIPTA 100-25 mcg/dose inhaler Generic drug:  fluticasone-vilanterol Take 1 Puff by inhalation daily. CALCIUM 600 + D 600-125 mg-unit Tab Generic drug:  calcium-cholecalciferol (d3) Take  by mouth. cholecalciferol 5,000 unit capsule Commonly known as:  VITAMIN D3 Take 1 Cap by mouth daily. clotrimazole-betamethasone topical cream  
Commonly known as:  Dave Cotton Apply a thin layer twice daily to the itchy spot on your left back. desogestrel-ethinyl estradiol 0.15-0.03 mg Tab Commonly known as:  DESOGEN Take 1 Tab by mouth daily. Do not start without approval from Neurologist.  Seizure meds may need adjustment. Skip placebo pills. folic acid 1 mg tablet Commonly known as:  Google Take  by mouth daily. * LaMICtal XR 50 mg Tr24 ER tablet Generic drug:  lamoTRIgine Take  by mouth. * LaMICtal  mg Tr24 ER tablet Generic drug:  lamoTRIgine Take  by mouth. ondansetron 8 mg disintegrating tablet Commonly known as:  ZOFRAN ODT  
 Take 1 Tab by mouth every twelve (12) hours as needed for Nausea. raNITIdine 75 mg tablet Commonly known as:  ZANTAC Take 75 mg by mouth two (2) times a day. SUMAtriptan 20 mg/actuation nasal spray Commonly known as:  IMITREX  
1 Spray as needed for Migraine. VIMPAT 200 mg Tab tablet Generic drug:  lacosamide Take 200 mg by mouth two (2) times a day. zonisamide 100 mg capsule Commonly known as:  Dorothye Sicard Take 200 mg by mouth nightly. * Notice: This list has 2 medication(s) that are the same as other medications prescribed for you. Read the directions carefully, and ask your doctor or other care provider to review them with you. Prescriptions Sent to Pharmacy Refills  
 clotrimazole-betamethasone (LOTRISONE) topical cream 0 Sig: Apply a thin layer twice daily to the itchy spot on your left back. Class: Normal  
 Pharmacy: [facility] [address] AT [address] [place] [address] Ph #: [phone]  
 desogestrel-ethinyl estradiol (DESOGEN) 0.15-0.03 mg tab 3 Sig: Take 1 Tab by mouth daily. Do not start without approval from Neurologist.  Seizure meds may need adjustment. Skip placebo pills. Class: Normal  
 Pharmacy: [facility] [address] AT [address] [place] [address] Ph #: [phone] Route: Oral  
  
To-Do List   
 02/14/2017 Lab:  CBC WITH AUTOMATED DIFF   
  
 02/14/2017 Lab:  METABOLIC PANEL, COMPREHENSIVE   
  
 02/14/2017 Lab:  MONO SCREEN W/ REFLX EBV   
  
 02/14/2017 Lab:  TSH AND FREE T4 Patient Instructions STAY UP TO DATE WITH YOUR PREVENTIVE HEALTH:    
Please review the following recommendations and if you are not sure that your healthcare is up to date, ask your provider at your next scheduled office visit.   
 
-- Yearly preventive visits (sometimes called \"physicals\") are recommended for all adults. Medicare and Medicaid do not pay for physicals. Medicare covers a yearly wellness visit that differs in many ways from a traditional physical, but is an opportunity to make sure you are maximizing the preventive services that will be covered by Medicare. Each insurance carrier will have different regulations about what services may be covered, so be sure to talk with your insurance provider before scheduling a visit. -- Colon cancer screening is recommended for all patients 48 and older with either colonoscopy (interval will vary depending on results) or yearly stool testing.   
 
-- Mammogram is recommended every 2 years for women ages 36 to 76. -- Pap smear is recommended every 3-5 years for women aged 24 to 72, unless your cervix has been surgically removed for benign reasons. -- Flu shot is recommended every fall for adults of all ages. -- Prevnar 15 is recommended at the age of 72 for all adults. -- Pneumovax is recommended one year after Prevnar 13 for all adults, but earlier if you have a history of chronic health problems (including diabetes and asthma) or smoking. -- Tetanus boosters are recommended every 10 years for adults of all ages. Medicare and Medicaid do not cover tetanus as a preventive booster, but will pay for patients to receive a booster in the event of certain injuries. INSTRUCTIONS AFTER YOUR VISIT ON 2/14/2017  
 
-- Try the topical cream on your \"purple spot\". It may be an early sign of something called pityriasis rosea (see the attached info). LAB WORK was ordered for today. Sign in for the lab at the . Results are generally reviewed within 10-14 days. LAB RESULTS can be obtained by logging into your Booster account. If you need assistance with accessing your account, you can call the [address] [place] [address] at #[phone]. 
 
-- Check with Dr. Faina Billingsley before starting oral contraceptive.   I would plan to have you skip placebo pills for 3 months at a time (so you have a bleeding cycle 4 times yearly) which should help keep levels stable TESTING RESULTS  
-- Lab results may become available for your review on [employer] before your provider has an opportunity to write you a note about results. Review of routine lab results will generally be done in 10-14 days. Please save your inquiries about results until your provider has had time to review them. -- If you have testing done outside of the office, please call to let us know the date and location that your testing was completed. Results can often be obtained faster if an inquiry is run. MEDICATION REFILLS   
 
-- Controlled substance refills (medications that require printed prescriptions for monitoring of use per [facility] guidelines) must be picked up in the office and per medical group policy will require a scheduled office visit with the provider. Calling the after hours answering service to request a controlled substance represents a violation of [facility] controlled substance policy. -- All other medication refills are to be requested by calling your pharmacy during regular office hours. The after hours physician on call is not required to respond to calls about medication refills. It is your responsibility to keep track of when you may be running out of medication and to place refill requests at least 3 business days prior. [place] Scheduling appointments can be done at the  or by calling [phone] and selecting option #1. HOLIDAY CLOSURES:  
 
The office is closed on six major holidays each year:  New Year's Day, July 4th, Memorial Day, Labor Day, Thanksgiving, and [place] Day. Lab and X-ray services are not available on those days. Pityriasis Rosea: Care Instructions Your Care Instructions Pityriasis rosea is a common skin rash. It usually starts as one scaly, reddish-pink spot on your stomach or back. Days or weeks later, more spots appear. The rash may itch, but it will not spread to other people. No one knows what causes pityriasis rosea. Some doctors believe it is a reaction to a virus. Pityriasis rosea is most common in children and young adults. It lasts 1 to 3 months and then goes away on its own. Medicine can help relieve any itching. Follow-up care is a key part of your treatment and safety. Be sure to make and go to all appointments, and call your doctor if you are having problems. It's also a good idea to know your test results and keep a list of the medicines you take. How can you care for yourself at home? · Use your medicine exactly as prescribed. Call your doctor if you have any problems with your medicine. · Expose your skin to small amounts of sunlight, but avoid sunburn. Sunlight can lessen the rash. · Use a mild soap, such as Dove or Cetaphil, when you wash your skin. · Add a handful of oatmeal (ground to a powder) to your bath. Or you can try an oatmeal bath product, such as Aveeno. Keep the water warm or lukewarm. A hot bath or shower may make the rash more visible and itchy. · Use an over-the-counter 1% hydrocortisone cream for small itchy areas. When should you call for help? Call your doctor now or seek immediate medical care if: 
· Your whole body itches, but you have no obvious rash or other cause. · Your itching is so bad that you cannot sleep. · Your home treatment does not help. · Your skin is badly broken from scratching. · You have signs of infection such as: 
¨ Pain, warmth, or swelling near the rash. ¨ Red streaks near the rash. ¨ Pus coming from the rash. ¨ A fever. · You see the rash on the palms of your hands or the soles of your feet. Watch closely for changes in your health, and be sure to contact your doctor if: · You do not get better as expected. Where can you learn more? Go to http://dayna-emery.info/. Enter S327 in the search box to learn more about \"Pityriasis Rosea: Care Instructions. \" Current as of: February 5, 2016 Content Version: 11.1 © 4679-5491 Skypaz. Care instructions adapted under license by [employer] (which disclaims liability or warranty for this information). If you have questions about a medical condition or this instruction, always ask your healthcare professional. [address] any warranty or liability for your use of this information. Introducing [address]! [facility] introduces The News Lens patient portal. Now you can access parts of your medical record, email your doctor's office, and request medication refills online. 1. In your internet browser, go to [email or web address] 2. Click on the First Time User? Click Here link in the Sign In box. You will see the New Member Sign Up page. 3. Enter your The News Lens Access Code exactly as it appears below. You will not need to use this code after youve completed the sign-up process. If you do not sign up before the expiration date, you must request a new code. · The News Lens Access Code: [email or web address] Expires: 5/15/2017  8:56 AM 
 
4. Enter the last four digits of your Social Security Number (xxxx) and Date of Birth (mm/dd/yyyy) as indicated and click Submit. You will be taken to the next sign-up page. 5. Create a Ellacoya Networkst ID. This will be your The News Lens login ID and cannot be changed, so think of one that is secure and easy to remember. 6. Create a [employer] Networkst password. You can change your password at any time. 7. Enter your Password Reset Question and Answer. This can be used at a later time if you forget your password. 8. Enter your e-mail address. You will receive e-mail notification when new information is available in [address] 9. Click Sign Up. You can now view and download portions of your medical record. 10. Click the Download Summary menu link to download a portable copy of your medical information. If you have questions, please visit the Frequently Asked Questions section of the Nautal website. Remember, Nautal is NOT to be used for urgent needs. For medical emergencies, dial 911. Now available from your iPhone and Android! Please provide this summary of care documentation to your next provider. Your primary care clinician is listed as Cyndie Rivera. If you have any questions after today's visit, please call [phone].

## 2017-02-15 ENCOUNTER — PATIENT MESSAGE (OUTPATIENT)
Dept: FAMILY MEDICINE CLINIC | Age: 27
End: 2017-02-15

## 2017-02-15 LAB
A-G RATIO,AGRAT: 1.8 RATIO (ref 1.1–2.6)
ABSOLUTE LYMPHOCYTE COUNT, 10803: 1.8 K/UL (ref 1–4.8)
ALBUMIN SERPL-MCNC: 4.7 G/DL (ref 3.5–5)
ALP SERPL-CCNC: 82 U/L (ref 25–115)
ALT SERPL-CCNC: 19 U/L (ref 5–40)
ANION GAP SERPL CALC-SCNC: 17 MMOL/L
AST SERPL W P-5'-P-CCNC: 18 U/L (ref 10–37)
BASOPHILS # BLD: 0 K/UL (ref 0–0.2)
BASOPHILS NFR BLD: 0 % (ref 0–2)
BILIRUB SERPL-MCNC: 0.4 MG/DL (ref 0.2–1.2)
BUN SERPL-MCNC: 10 MG/DL (ref 6–22)
CALCIUM SERPL-MCNC: 9.9 MG/DL (ref 8.4–10.5)
CHLORIDE SERPL-SCNC: 101 MMOL/L (ref 98–110)
CO2 SERPL-SCNC: 25 MMOL/L (ref 20–32)
CREAT SERPL-MCNC: 0.7 MG/DL (ref 0.5–1.2)
EOSINOPHIL # BLD: 0.2 K/UL (ref 0–0.5)
EOSINOPHIL NFR BLD: 2 % (ref 0–6)
ERYTHROCYTE [DISTWIDTH] IN BLOOD BY AUTOMATED COUNT: 11.8 % (ref 10–16)
GFRAA, 66117: >60
GFRNA, 66118: >60
GLOBULIN,GLOB: 2.6 G/DL (ref 2–4)
GLUCOSE SERPL-MCNC: 89 MG/DL (ref 65–99)
GRANULOCYTES,GRANS: 70 % (ref 40–75)
HCT VFR BLD AUTO: 41.6 % (ref 35.1–46.5)
HGB BLD-MCNC: 14.2 G/DL (ref 11.7–15.5)
LYMPHOCYTES, LYMLT: 20 % (ref 27–45)
MCH RBC QN AUTO: 33 PG (ref 26–34)
MCHC RBC AUTO-ENTMCNC: 34 G/DL (ref 32–36)
MCV RBC AUTO: 98 FL (ref 80–95)
MONOCYTES # BLD: 0.7 K/UL (ref 0.1–0.9)
MONOCYTES NFR BLD: 7 % (ref 3–9)
MONONUCLEOSIS SCREEN: NEGATIVE
NEUTROPHILS # BLD AUTO: 6.4 K/UL (ref 1.8–7.7)
PLATELET # BLD AUTO: 339 K/UL (ref 140–440)
PMV BLD AUTO: 9.5 FL (ref 6–10.8)
POTASSIUM SERPL-SCNC: 5 MMOL/L (ref 3.5–5.5)
PROT SERPL-MCNC: 7.3 G/DL (ref 6.4–8.3)
RBC # BLD AUTO: 4.25 M/UL (ref 3.8–5.2)
SODIUM SERPL-SCNC: 143 MMOL/L (ref 133–145)
T4 FREE SERPL-MCNC: 1.3 NG/DL (ref 0.9–1.8)
TSH SERPL DL<=0.005 MIU/L-ACNC: 1.13 MCU/ML (ref 0.27–4.2)
WBC # BLD AUTO: 9.2 K/UL (ref 4–11)

## 2017-02-16 NOTE — TELEPHONE ENCOUNTER
From: Brandon Orozco  To: Brandon Orozco MD  Sent: 2/15/2017 9:15 PM EST  Subject: Visit Claudine Daughters Dr. Valorie Conklin,  You mentioned checking thyroid levels during my appointment. Later, I realized that I should have mentioned the fact that my sister has Hashimoto's. I assume it's in my record, but it's always good to double check those things. Thank you for your thoroughness with me! I truly appreciate it!

## 2017-02-17 NOTE — PROGRESS NOTES
AmberPointhart message to patient regarding results (see below) has been generated. Bloodwork doesn't suggest a bacterial infection. There are no liver or kidney abnormalities or electrolyte abnormalities that would cause a seizure. Mono testing was negative.  Thyroid testing is normal.

## 2017-03-02 ENCOUNTER — TELEPHONE (OUTPATIENT)
Dept: FAMILY MEDICINE CLINIC | Age: 27
End: 2017-03-02

## 2017-03-02 DIAGNOSIS — Z51.81 MEDICATION MONITORING ENCOUNTER: ICD-10-CM

## 2017-03-02 DIAGNOSIS — R56.9 SEIZURES (HCC): Chronic | ICD-10-CM

## 2017-03-02 DIAGNOSIS — R56.9 SEIZURES (HCC): Primary | Chronic | ICD-10-CM

## 2017-03-06 LAB — LAMOTRIGINE SERPL-MCNC: 5.8 UG/ML

## 2017-03-16 DIAGNOSIS — Z51.81 MEDICATION MONITORING ENCOUNTER: ICD-10-CM

## 2017-03-16 DIAGNOSIS — R56.9 SEIZURES (HCC): Chronic | ICD-10-CM

## 2017-05-25 ENCOUNTER — OFFICE VISIT (OUTPATIENT)
Dept: FAMILY MEDICINE CLINIC | Age: 27
End: 2017-05-25

## 2017-05-25 VITALS
RESPIRATION RATE: 20 BRPM | BODY MASS INDEX: 20.76 KG/M2 | HEART RATE: 113 BPM | HEIGHT: 68 IN | DIASTOLIC BLOOD PRESSURE: 66 MMHG | SYSTOLIC BLOOD PRESSURE: 98 MMHG | WEIGHT: 137 LBS | TEMPERATURE: 98.3 F

## 2017-05-25 DIAGNOSIS — R06.2 WHEEZING: ICD-10-CM

## 2017-05-25 DIAGNOSIS — J06.9 UPPER RESPIRATORY TRACT INFECTION, UNSPECIFIED TYPE: Primary | ICD-10-CM

## 2017-05-25 DIAGNOSIS — D80.2 IGA DEFICIENCY (HCC): Chronic | ICD-10-CM

## 2017-05-25 RX ORDER — METHYLPREDNISOLONE 4 MG/1
TABLET ORAL
Qty: 1 DOSE PACK | Refills: 0 | Status: SHIPPED | OUTPATIENT
Start: 2017-05-25 | End: 2017-12-01 | Stop reason: ALTCHOICE

## 2017-05-25 RX ORDER — IPRATROPIUM BROMIDE AND ALBUTEROL SULFATE 2.5; .5 MG/3ML; MG/3ML
3 SOLUTION RESPIRATORY (INHALATION)
Qty: 1 NEBULE | Refills: 0
Start: 2017-05-25 | End: 2017-05-25

## 2017-05-25 RX ORDER — AZITHROMYCIN 500 MG/1
500 TABLET, FILM COATED ORAL DAILY
Qty: 10 TAB | Refills: 0 | Status: SHIPPED | OUTPATIENT
Start: 2017-05-25 | End: 2017-06-04

## 2017-05-25 NOTE — MR AVS SNAPSHOT
Visit Information Date & Time Provider Department Dept. Phone Encounter #  
 5/25/2017  1:45 PM Sinai Gray, 3 [facility] [phone] [id number] Upcoming Health Maintenance Date Due  
 PAP AKA CERVICAL CYTOLOGY 4/19/2012 HPV AGE 9Y-26Y (2 of 3 - Female 3 Dose Series) 3/23/2016 INFLUENZA AGE 9 TO ADULT 8/1/2017 Pneumococcal 19-64 Highest Risk (3 of 3 - PCV13) 8/22/2017 DTaP/Tdap/Td series (3 - Td) 10/21/2021 Allergies as of 5/25/2017  Review Complete On: 5/25/2017 By: Sinai Gray MD  
  
 Severity Noted Reaction Type Reactions Amoxicillin High 04/01/2014   Intolerance Hives Levaquin [Levofloxacin] High 01/27/2016   Side Effect Seizures Seizure - lowered seizure threshold Sulfa (Sulfonamide Antibiotics)  10/20/2015    Other (comments) Vision and balance problem Current Immunizations  Reviewed on 12/9/2016 Name Date Hep A Vaccine (Adult) 5/24/2012, 10/21/2011 Hep B, Adol/Ped 4/20/1993, 10/15/1992 IPV 10/21/2011, 4/20/1995 Influenza Vaccine 10/20/2015 10:59 AM  
 Influenza Vaccine PF 10/29/2014  4:04 PM  
 Japanese Encephalitis Vaccine (SQ) 11/23/2011, 10/21/2011 MMR 4/20/1995, 7/20/1992 Meningococcal (MPSV4) Vaccine 11/15/2008 Pneumococcal Polysaccharide (PPSV-23) 8/22/2016, 7/1/2011 Td 10/7/2005 Tdap 10/21/2011 Typhoid Vi Capsular Polysaccharide Vaccine 10/21/2011 Not reviewed this visit You Were Diagnosed With   
  
 Codes Comments Upper respiratory tract infection, unspecified type    -  Primary ICD-10-CM: J06.9 ICD-9-CM: 465.9 [facility] ([facility]     ICD-10-CM: D80.2 ICD-9-CM: 279.01 Wheezing     ICD-10-CM: R06.2 ICD-9-CM: 786.07 Vitals BP Pulse Temp Resp Height(growth percentile) Weight(growth percentile) 98/66 (BP 1 Location: Left arm, BP Patient Position: Sitting) (!) 113 98.3 °F (36.8 °C) (Oral) 20 5' 8\" (1.727 m) 137 lb (62.1 kg) BMI OB Status Smoking Status 20.83 kg/m2 Chemically Induced Never Smoker Vitals History BMI and BSA Data Body Mass Index Body Surface Area  
 20.83 kg/m 2 1.73 m 2 Preferred Pharmacy Pharmacy Name Phone [facility] [address] [phone] Your Updated Medication List  
  
   
This list is accurate as of: 5/25/17  2:33 PM.  Always use your most recent med list.  
  
  
  
  
 albuterol-ipratropium 2.5 mg-0.5 mg/3 ml Nebu Commonly known as:  DUO-NEB  
3 mL by Nebulization route now for 1 dose. ALLER-MARSHALL PO Take  by mouth. azithromycin 500 mg Tab Commonly known as:  Raimundo Kendra Take 1 Tab by mouth daily for 10 days. BREO ELLIPTA 100-25 mcg/dose inhaler Generic drug:  fluticasone-vilanterol Take 1 Puff by inhalation daily. CALCIUM 600 + D 600-125 mg-unit Tab Generic drug:  calcium-cholecalciferol (d3) Take  by mouth. cholecalciferol 5,000 unit capsule Commonly known as:  VITAMIN D3 Take 1 Cap by mouth daily. folic acid 1 mg tablet Commonly known as:  Google Take  by mouth daily. LaMICtal  mg Tr24 ER tablet Generic drug:  lamoTRIgine Take  by mouth.  
  
 methylPREDNISolone 4 mg tablet Commonly known as:  Ar Quintana As directed  
  
 ondansetron 8 mg disintegrating tablet Commonly known as:  ZOFRAN ODT Take 1 Tab by mouth every twelve (12) hours as needed for Nausea. PROAIR RESPICLICK 90 mcg/actuation Aepb Generic drug:  albuterol sulfate Take 90 mcg by mouth four (4) times daily. raNITIdine 75 mg tablet Commonly known as:  ZANTAC Take 75 mg by mouth two (2) times a day. SUMAtriptan 20 mg/actuation nasal spray Commonly known as:  IMITREX  
1 Spray as needed for Migraine. VIMPAT 200 mg Tab tablet Generic drug:  lacosamide Take 200 mg by mouth two (2) times a day. zonisamide 100 mg capsule Commonly known as:  Ronnell Rain Take 200 mg by mouth nightly. Prescriptions Sent to Pharmacy Refills  
 azithromycin (ZITHROMAX) 500 mg tab 0 Sig: Take 1 Tab by mouth daily for 10 days. Class: Normal  
 Pharmacy: [facility] [address] AT [address] [place] [address] Ph #: [phone] Route: Oral  
 methylPREDNISolone (MEDROL DOSEPACK) 4 mg tablet 0 Sig: As directed Class: Normal  
 Pharmacy: [facility] [address] AT [address] [place] [address] Ph #: [phone] We Performed the Following ALBUTEROL IPRATROP NON-COMP [id number] [facility]] OR INHAL RX, AIRWAY OBST/DX SPUTUM INDUCT [id number] CPT(R)] Patient Instructions Upper Respiratory Infection (Cold): Care Instructions Your Care Instructions An upper respiratory infection, or URI, is an infection of the nose, sinuses, or throat. URIs are spread by coughs, sneezes, and direct contact. The common cold is the most frequent kind of URI. The flu and sinus infections are other kinds of URIs. Almost all URIs are caused by viruses. Antibiotics won't cure them. But you can treat most infections with home care. This may include drinking lots of fluids and taking over-the-counter pain medicine. You will probably feel better in 4 to 10 days. The doctor has checked you carefully, but problems can develop later. If you notice any problems or new symptoms, get medical treatment right away. Follow-up care is a key part of your treatment and safety. Be sure to make and go to all appointments, and call your doctor if you are having problems. It's also a good idea to know your test results and keep a list of the medicines you take. How can you care for yourself at home?  
· To prevent dehydration, drink plenty of fluids, enough so that your urine is light yellow or clear like water. Choose water and other caffeine-free clear liquids until you feel better. If you have kidney, heart, or liver disease and have to limit fluids, talk with your doctor before you increase the amount of fluids you drink. · Take an over-the-counter pain medicine, such as acetaminophen (Tylenol), ibuprofen (Advil, Motrin), or naproxen (Aleve). Read and follow all instructions on the label. · Before you use cough and cold medicines, check the label. These medicines may not be safe for young children or for people with certain health problems. · Be careful when taking over-the-counter cold or flu medicines and Tylenol at the same time. Many of these medicines have acetaminophen, which is Tylenol. Read the labels to make sure that you are not taking more than the recommended dose. Too much acetaminophen (Tylenol) can be harmful. · Get plenty of rest. 
· Do not smoke or allow others to smoke around you. If you need help quitting, talk to your doctor about stop-smoking programs and medicines. These can increase your chances of quitting for good. When should you call for help? Call 911 anytime you think you may need emergency care. For example, call if: 
· You have severe trouble breathing. Call your doctor now or seek immediate medical care if: 
· You seem to be getting much sicker. · You have new or worse trouble breathing. · You have a new or higher fever. · You have a new rash. Watch closely for changes in your health, and be sure to contact your doctor if: 
· You have a new symptom, such as a sore throat, an earache, or sinus pain. · You cough more deeply or more often, especially if you notice more mucus or a change in the color of your mucus. · You do not get better as expected. Where can you learn more? Go to http://dayna-emery.info/. Enter H225 in the search box to learn more about \"Upper Respiratory Infection (Cold): Care Instructions. \" 
 Current as of: June 30, 2016 Content Version: 11.2 © 7967-2482 Hunch, [employer]. Care instructions adapted under license by [employer] (which disclaims liability or warranty for this information). If you have questions about a medical condition or this instruction, always ask your healthcare professional. [address] any warranty or liability for your use of this information. Introducing [facility]! Dear Girma Jeronimo: Thank you for requesting a uBank account. Our records indicate that you already have an active uBank account. You can access your account anytime at [email or web address] Did you know that you can access your hospital and ER discharge instructions at any time in uBank? You can also review all of your test results from your hospital stay or ER visit. Additional Information If you have questions, please visit the Frequently Asked Questions section of the uBank website at [email or web address] Remember, uBank is NOT to be used for urgent needs. For medical emergencies, dial 911. Now available from your iPhone and Android! Please provide this summary of care documentation to your next provider. Your primary care clinician is listed as Beryle Bathe. If you have any questions after today's visit, please call [phone].

## 2017-05-25 NOTE — PROGRESS NOTES
Assessment/Plan:    Carina Cooley was seen today for cough and fever. Diagnoses and all orders for this visit:    Upper respiratory tract infection, unspecified type  -     ALBUTEROL IPRATROP NON-COMP  -     MN INHAL RX, AIRWAY OBST/DX SPUTUM INDUCT  -     albuterol-ipratropium (DUO-NEB) 2.5 mg-0.5 mg/3 ml nebu; 3 mL by Nebulization route now for 1 dose. -     azithromycin (ZITHROMAX) 500 mg tab; Take 1 Tab by mouth daily for 10 days. -     methylPREDNISolone (MEDROL DOSEPACK) 4 mg tablet; As directed    IgA deficiency (HCC)    Wheezing  -     ALBUTEROL IPRATROP NON-COMP  -     MN INHAL RX, AIRWAY OBST/DX SPUTUM INDUCT  -     albuterol-ipratropium (DUO-NEB) 2.5 mg-0.5 mg/3 ml nebu; 3 mL by Nebulization route now for 1 dose. Will call if not improving. The plan was discussed with the patient. The patient verbalized understanding and is in agreement with the plan. All medication potential side effects were discussed with the patient.    -------------------------------------------------------------------------------------------------------------------        Cheryl Lee is a 33 y.o. female and presents with Cough (productive , congestion) and Fever         Subjective:  Pt here for + cough, productive + chest congestion, + wheezing. Symptoms started back on 5/9/17. She says with her Ig A defic, she typically does not run to the doctor right away. She has an allergist / pulmonologist who generated a protocol that she follows with an increase of her inhalers when she gets sick. This has helped but not completely resolved. She also has several Abt allergies and notes that she always needs a longer course of Abts in order to get better. ROS:  Constitutional: No recent weight change. No weakness/fatigue. No f/c. Skin: No rashes, change in nails/hair, itching   HENT: No HA, dizziness. No hearing loss/tinnitus. No nasal congestion/discharge. Eyes: No change in vision, double/blurred vision or eye pain/redness. Cardiovascular: No CP/palpitations. No JOHN/orthopnea/PND. Respiratory: No cough/sputum, dyspnea, wheezing. Gastointestinal: No dysphagia, reflux. No n/v. No constipation/diarrhea. No melena/rectal bleeding. Genitourinary: No dysuria, urinary hesitancy, nocturia, hematuria. No incontinence. Musculoskeletal: No joint pain/stiffness. No muscle pain/tenderness. Endo: No heat/cold intolerance, no polyuria/polydypsia. Heme: No h/o anemia. No easy bleeding/bruising. Allergy/Immunology: No seasonal rhinitis. Denies frequent colds, sinus/ear infections. Neurological: No seizures/numbness/weakness. No paresthesias. Psychiatric:  No depression, anxiety. The problem list was updated as a part of today's visit. Patient Active Problem List   Diagnosis Code    Seizures ([facility] [address] R56.9    [facility] D80.2    Asthma J45.909    Acne vulgaris L70.0    Routine adult health maintenance Z00.00    Dizziness R42    Migraine without aura and without status migrainosus, not intractable G43.009    Family history of thyroid disease in sister - Hashimoto's Z80.51       The PSH, FH were reviewed. SH:  Social History   Substance Use Topics    Smoking status: Never Smoker    Smokeless tobacco: Never Used    Alcohol use Yes      Comment: 1/wk       Medications/Allergies:  Current Outpatient Prescriptions on File Prior to Visit   Medication Sig Dispense Refill    SUMAtriptan (IMITREX) 20 mg/actuation nasal spray 1 Spray as needed for Migraine.  fluticasone-vilanterol (BREO ELLIPTA) 100-25 mcg/dose inhaler Take 1 Puff by inhalation daily.  ondansetron (ZOFRAN ODT) 8 mg disintegrating tablet Take 1 Tab by mouth every twelve (12) hours as needed for Nausea. 20 Tab 4    cholecalciferol (VITAMIN D3) 5,000 unit capsule Take 1 Cap by mouth daily.  90 Cap 3    zonisamide (ZONEGRAN) 100 mg capsule Take 200 mg by mouth nightly.  lacosamide (VIMPAT) 200 mg tab tablet Take 200 mg by mouth two (2) times a day.  CETIRIZINE HCL (ALLER-MARSHALL PO) Take  by mouth.  ranitidine (ZANTAC) 75 mg tablet Take 75 mg by mouth two (2) times a day.  calcium-cholecalciferol, d3, (CALCIUM 600 + D) 600-125 mg-unit tab Take  by mouth.  folic acid (FOLVITE) 1 mg tablet Take  by mouth daily.  Lamotrigine (LAMICTAL XR) 200 mg tr24 Take  by mouth. No current facility-administered medications on file prior to visit. Allergies   Allergen Reactions    Amoxicillin Hives    Levaquin [Levofloxacin] Seizures     Seizure - lowered seizure threshold    Sulfa (Sulfonamide Antibiotics) Other (comments)     Vision and balance problem         Health Maintenance:   Health Maintenance   Topic Date Due    PAP AKA CERVICAL CYTOLOGY  04/19/2012    HPV AGE 9Y-26Y (2 of 3 - Female 3 Dose Series) 03/23/2016    INFLUENZA AGE 9 TO ADULT  08/01/2017    Pneumococcal 19-64 Highest Risk (3 of 3 - PCV13) 08/22/2017    DTaP/Tdap/Td series (3 - Td) 10/21/2021       Objective:  Visit Vitals    BP 98/66 (BP 1 Location: Left arm, BP Patient Position: Sitting)    Pulse (!) 113    Temp 98.3 °F (36.8 °C) (Oral)    Resp 20    Ht 5' 8\" (1.727 m)    Wt 137 lb (62.1 kg)    BMI 20.83 kg/m2          Nurses notes and VS reviewed. Physical Examination: General appearance - ill-appearing and looking tired.   Ears - bilateral TM's and external ear canals normal  Mouth - erythematous  Neck - supple, no significant adenopathy  Chest - + mild wheezing, + scattered rhonchi  Heart - normal rate and regular rhythm        Labwork and Ancillary Studies:    CBC w/Diff  Lab Results   Component Value Date/Time    WBC 9.2 02/14/2017 10:01 AM    HGB 14.2 02/14/2017 10:01 AM    PLATELET 285 93/81/6539 10:01 AM         Basic Metabolic Profile  Lab Results   Component Value Date/Time    Sodium 143 02/14/2017 10:01 AM Potassium 5.0 02/14/2017 10:01 AM    Chloride 101 02/14/2017 10:01 AM    CO2 25 02/14/2017 10:01 AM    Anion gap 17.0 02/14/2017 10:01 AM    Glucose 89 02/14/2017 10:01 AM    BUN 10 02/14/2017 10:01 AM    Creatinine 0.7 02/14/2017 10:01 AM    Calcium 9.9 02/14/2017 10:01 AM         LFT  Lab Results   Component Value Date/Time    ALT (SGPT) 19 02/14/2017 10:01 AM    AST (SGOT) 18 02/14/2017 10:01 AM    Alk.  phosphatase 82 02/14/2017 10:01 AM    Bilirubin, direct <0.2 10/02/2014 02:40 PM    Bilirubin, total 0.4 02/14/2017 10:01 AM         Cholesterol  Lab Results   Component Value Date/Time    Cholesterol, total 154 12/02/2016 07:18 AM    HDL Cholesterol 65 12/02/2016 07:18 AM    LDL, calculated 82 12/02/2016 07:18 AM    Triglyceride 35 12/02/2016 07:18 AM

## 2017-05-25 NOTE — PROGRESS NOTES
1. Have you been to the ER, urgent care clinic since your last visit? Hospitalized since your last visit? Yes,  March 2017 [facility]       2. Have you seen or consulted any other health care providers outside of the [address] [place] [address] since your last visit? Include any pap smears or colon screening.    [facility] surgery March 9 2017 distal radial fracture  , pulmonary Dec 2016 and neurology Dr. Js Patel

## 2017-05-25 NOTE — PATIENT INSTRUCTIONS

## 2017-10-05 ENCOUNTER — OFFICE VISIT (OUTPATIENT)
Dept: FAMILY MEDICINE CLINIC | Age: 27
End: 2017-10-05

## 2017-10-05 VITALS
TEMPERATURE: 97.2 F | BODY MASS INDEX: 21.37 KG/M2 | DIASTOLIC BLOOD PRESSURE: 69 MMHG | HEART RATE: 75 BPM | SYSTOLIC BLOOD PRESSURE: 119 MMHG | HEIGHT: 68 IN | RESPIRATION RATE: 18 BRPM | WEIGHT: 141 LBS | OXYGEN SATURATION: 100 %

## 2017-10-05 DIAGNOSIS — Z23 ENCOUNTER FOR IMMUNIZATION: Primary | ICD-10-CM

## 2017-10-05 DIAGNOSIS — J01.90 ACUTE NON-RECURRENT SINUSITIS, UNSPECIFIED LOCATION: ICD-10-CM

## 2017-10-05 RX ORDER — DOXYCYCLINE 100 MG/1
100 TABLET ORAL 2 TIMES DAILY
Qty: 20 TAB | Refills: 0 | Status: SHIPPED | OUTPATIENT
Start: 2017-10-05 | End: 2017-10-15

## 2017-10-05 NOTE — PATIENT INSTRUCTIONS
Vaccine Information Statement    Influenza (Flu) Vaccine (Inactivated or Recombinant): What you need to know    Many Vaccine Information Statements are available in Irish and other languages. See www.immunize.org/vis  Hojas de Información Sobre Vacunas están disponibles en Español y en muchos otros idiomas. Visite www.immunize.org/vis    1. Why get vaccinated? Influenza (flu) is a contagious disease that spreads around the [place] every year, usually between October and May. Flu is caused by influenza viruses, and is spread mainly by coughing, sneezing, and close contact. Anyone can get flu. Flu strikes suddenly and can last several days. Symptoms vary by age, but can include:   fever/chills   sore throat   muscle aches   fatigue   cough   headache    runny or stuffy nose    Flu can also lead to pneumonia and blood infections, and cause diarrhea and seizures in children. If you have a medical condition, such as heart or lung disease, flu can make it worse. Flu is more dangerous for some people. Infants and young children, people 72years of age and older, pregnant women, and people with certain health conditions or a weakened immune system are at greatest risk. Each year thousands of people in the [facility] die from flu, and many more are hospitalized. Flu vaccine can:   keep you from getting flu,   make flu less severe if you do get it, and   keep you from spreading flu to your family and other people. 2. Inactivated and recombinant flu vaccines    A dose of flu vaccine is recommended every flu season. Children 6 months through 6years of age may need two doses during the same flu season. Everyone else needs only one dose each flu season.        Some inactivated flu vaccines contain a very small amount of a mercury-based preservative called thimerosal. Studies have not shown thimerosal in vaccines to be harmful, but flu vaccines that do not contain thimerosal are available. There is no live flu virus in flu shots. They cannot cause the flu. There are many flu viruses, and they are always changing. Each year a new flu vaccine is made to protect against three or four viruses that are likely to cause disease in the upcoming flu season. But even when the vaccine doesnt exactly match these viruses, it may still provide some protection    Flu vaccine cannot prevent:   flu that is caused by a virus not covered by the vaccine, or   illnesses that look like flu but are not. It takes about 2 weeks for protection to develop after vaccination, and protection lasts through the flu season. 3. Some people should not get this vaccine    Tell the person who is giving you the vaccine:     If you have any severe, life-threatening allergies. If you ever had a life-threatening allergic reaction after a dose of flu vaccine, or have a severe allergy to any part of this vaccine, you may be advised not to get vaccinated. Most, but not all, types of flu vaccine contain a small amount of egg protein.  If you ever had Guillain-Barré Syndrome (also called GBS). Some people with a history of GBS should not get this vaccine. This should be discussed with your doctor.  If you are not feeling well. It is usually okay to get flu vaccine when you have a mild illness, but you might be asked to come back when you feel better. 4. Risks of a vaccine reaction    With any medicine, including vaccines, there is a chance of reactions. These are usually mild and go away on their own, but serious reactions are also possible. Most people who get a flu shot do not have any problems with it.      Minor problems following a flu shot include:    soreness, redness, or swelling where the shot was given     hoarseness   sore, red or itchy eyes   cough   fever   aches   headache   itching   fatigue  If these problems occur, they usually begin soon after the shot and last 1 or 2 days. More serious problems following a flu shot can include the following:     There may be a small increased risk of Guillain-Barré Syndrome (GBS) after inactivated flu vaccine. This risk has been estimated at 1 or 2 additional cases per million people vaccinated. This is much lower than the risk of severe complications from flu, which can be prevented by flu vaccine.  Young children who get the flu shot along with pneumococcal vaccine (PCV13) and/or DTaP vaccine at the same time might be slightly more likely to have a seizure caused by fever. Ask your doctor for more information. Tell your doctor if a child who is getting flu vaccine has ever had a seizure. Problems that could happen after any injected vaccine:      People sometimes faint after a medical procedure, including vaccination. Sitting or lying down for about 15 minutes can help prevent fainting, and injuries caused by a fall. Tell your doctor if you feel dizzy, or have vision changes or ringing in the ears.  Some people get severe pain in the shoulder and have difficulty moving the arm where a shot was given. This happens very rarely.  Any medication can cause a severe allergic reaction. Such reactions from a vaccine are very rare, estimated at about 1 in a million doses, and would happen within a few minutes to a few hours after the vaccination. As with any medicine, there is a very remote chance of a vaccine causing a serious injury or death. The safety of vaccines is always being monitored. For more information, visit: www.cdc.gov/vaccinesafety/    5. What if there is a serious reaction? What should I look for?  Look for anything that concerns you, such as signs of a severe allergic reaction, very high fever, or unusual behavior.     Signs of a severe allergic reaction can include hives, swelling of the face and throat, difficulty breathing, a fast heartbeat, dizziness, and weakness - usually within a few minutes to a few hours after the vaccination. What should I do?  If you think it is a severe allergic reaction or other emergency that cant wait, call 9-1-1 and get the person to the nearest hospital. Otherwise, call your doctor.  Reactions should be reported to the Vaccine Adverse Event Reporting System (VAERS). Your doctor should file this report, or you can do it yourself through  the VAERS web site at www.vaers. [facility].gov, or by calling 9-425.135.9515. VAERS does not give medical advice. 6. The National Vaccine Injury Compensation Program    The [facility] Vaccine Injury Compensation Program (VICP) is a federal program that was created to compensate people who may have been injured by certain vaccines. Persons who believe they may have been injured by a vaccine can learn about the program and about filing a claim by calling 7-334.267.4651 or visiting the [employer] website at www.[facility] Center[facility]irtus[facility].gov/vaccinecompensation. There is a time limit to file a claim for compensation. 7. How can I learn more?  Ask your healthcare provider. He or she can give you the vaccine package insert or suggest other sources of information.  Call your local or state health department.  Contact the Centers for Disease Control and Prevention (CDC):  - Call 2-886.616.8715 (1-800-CDC-INFO) or  - Visit CDCs website at www.cdc.gov/flu    Vaccine Information Statement   Inactivated Influenza Vaccine   8/7/2015  [address] 111UI-08    Department of Health and Human Services  Centers for Disease Control and Prevention    Office Use Only       Sinusitis: Care Instructions  Your Care Instructions    Sinusitis is an infection of the lining of the sinus cavities in your head. Sinusitis often follows a cold. It causes pain and pressure in your head and face. In most cases, sinusitis gets better on its own in 1 to 2 weeks. But some mild symptoms may last for several weeks. Sometimes antibiotics are needed.   Follow-up care is a key part of your treatment and safety. Be sure to make and go to all appointments, and call your doctor if you are having problems. It's also a good idea to know your test results and keep a list of the medicines you take. How can you care for yourself at home? · Take an over-the-counter pain medicine, such as acetaminophen (Tylenol), ibuprofen (Advil, Motrin), or naproxen (Aleve). Read and follow all instructions on the label. · If the doctor prescribed antibiotics, take them as directed. Do not stop taking them just because you feel better. You need to take the full course of antibiotics. · Be careful when taking over-the-counter cold or flu medicines and Tylenol at the same time. Many of these medicines have acetaminophen, which is Tylenol. Read the labels to make sure that you are not taking more than the recommended dose. Too much acetaminophen (Tylenol) can be harmful. · Breathe warm, moist air from a steamy shower, a hot bath, or a sink filled with hot water. Avoid cold, dry air. Using a humidifier in your home may help. Follow the directions for cleaning the machine. · Use saline (saltwater) nasal washes to help keep your nasal passages open and wash out mucus and bacteria. You can buy saline nose drops at a grocery store or drugstore. Or you can make your own at home by adding 1 teaspoon of salt and 1 teaspoon of baking soda to 2 cups of distilled water. If you make your own, fill a bulb syringe with the solution, insert the tip into your nostril, and squeeze gently. Sidonie Lakisha your nose. · Put a hot, wet towel or a warm gel pack on your face 3 or 4 times a day for 5 to 10 minutes each time. · Try a decongestant nasal spray like oxymetazoline (Afrin). Do not use it for more than 3 days in a row. Using it for more than 3 days can make your congestion worse. When should you call for help?   Call your doctor now or seek immediate medical care if:  · You have new or worse swelling or redness in your face or around your eyes. · You have a new or higher fever. Watch closely for changes in your health, and be sure to contact your doctor if:  · You have new or worse facial pain. · The mucus from your nose becomes thicker (like pus) or has new blood in it. · You are not getting better as expected. Where can you learn more? Go to http://dayna-emery.info/. Enter I618 in the search box to learn more about \"Sinusitis: Care Instructions. \"  Current as of: July 29, 2016  Content Version: 11.3  © 5633-3064 [employer]. Care instructions adapted under license by "[employer]" (which disclaims liability or warranty for this information). If you have questions about a medical condition or this instruction, always ask your healthcare professional. [address] any warranty or liability for your use of this information.

## 2017-10-05 NOTE — PROGRESS NOTES
Andrea Cummings is a 33 y.o. female (: 1990) presenting to address:sinus congestion    Chief Complaint   Patient presents with    Nasal Congestion     x 3 weeks     Cough    Ear Pain       Vitals:    10/05/17 1523   BP: 119/69   Pulse: 75   Resp: 18   Temp: 97.2 °F (36.2 °C)   TempSrc: Oral   SpO2: 100%   Weight: 141 lb (64 kg)   Height: 5' 8\" (1.727 m)   PainSc:   2   PainLoc: Face       Hearing/Vision:   No exam data present    Learning Assessment:     Learning Assessment 2015   PRIMARY LEARNER Patient   HIGHEST LEVEL OF EDUCATION - PRIMARY LEARNER  > 4 YEARS OF COLLEGE   BARRIERS PRIMARY LEARNER NONE   PRIMARY LANGUAGE ENGLISH   LEARNER PREFERENCE PRIMARY OTHER (COMMENT)   ANSWERED BY patient   RELATIONSHIP SELF     Depression Screening:     PHQ over the last two weeks 2017   Little interest or pleasure in doing things Not at all   Feeling down, depressed or hopeless Not at all   Total Score PHQ 2 0     Fall Risk Assessment:   No flowsheet data found. Abuse Screening:     Abuse Screening Questionnaire 2016   Do you ever feel afraid of your partner? N   Are you in a relationship with someone who physically or mentally threatens you? N   Is it safe for you to go home? Y     Coordination of Care Questionaire:   1. Have you been to the ER, urgent care clinic since your last visit? Hospitalized since your last visit? no    2. Have you seen or consulted any other health care providers outside of the [address] [place] [address] since your last visit? Include any pap smears or colon screening. Neurology, OBGYN, dermatology [facility]    Advanced Directive:   1. Do you have an Advanced Directive? no    2. Would you like information on Advanced Directives? no    Patient would like to receive flu vaccine.

## 2017-10-05 NOTE — PROGRESS NOTES
Subjective:      Chico Horner is a 33 y.o. female who presents for evaluation of possible sinus infection. Symptoms include bilateral ear pressure/pain, facial pain, headache described as dull pressure, nasal congestion and sinus pressure with no fever, chills, or night sweats. Onset of symptoms was 3 weeks ago, gradually worsening since that time. She is drinking plenty of fluids. .  Past history is significant for no history of pneumonia or bronchitis. Patient is a non-smoker. Past Medical History:   Diagnosis Date    Acne vulgaris     Currently on 2nd course of Acutane, sees Dr. Ciarra Acosta Asthma     Exercise-induced asthma, may have been from chronic bronchitis; doing well on Advair     Family history of thyroid disease in sister - Hashimoto's     IgA deficiency [facility])     Initially thought to be secondary to Carbatrol; followed by pediatric allergy/immunology through [facility], has not resolved with med change; every 5 year PPSV23    Migraine without aura and without status migrainosus, not intractable 2/14/2017    Seizures ([facility] [address] 9years old    Absence seizures originally, developed tonic-clonic seizures in 2007; Followed by Dr. Sushil Deng      Family History   Problem Relation Age of Onset    Thyroid Disease Sister      Hashimoto's    Heart Disease Maternal Grandmother     Heart Disease Paternal Grandfather      Current Outpatient Prescriptions   Medication Sig Dispense Refill    doxycycline (ADOXA) 100 mg tablet Take 1 Tab by mouth two (2) times a day for 10 days. 20 Tab 0    albuterol sulfate (PROAIR RESPICLICK) 90 mcg/actuation aepb Take 90 mcg by mouth four (4) times daily.  SUMAtriptan (IMITREX) 20 mg/actuation nasal spray 1 Spray as needed for Migraine.  fluticasone-vilanterol (BREO ELLIPTA) 100-25 mcg/dose inhaler Take 1 Puff by inhalation daily.  ondansetron (ZOFRAN ODT) 8 mg disintegrating tablet Take 1 Tab by mouth every twelve (12) hours as needed for Nausea.  21 Tab 4    cholecalciferol (VITAMIN D3) 5,000 unit capsule Take 1 Cap by mouth daily. 90 Cap 3    zonisamide (ZONEGRAN) 100 mg capsule Take 400 mg by mouth two (2) times a day.  CETIRIZINE HCL (ALLER-MARSHALL PO) Take  by mouth.  ranitidine (ZANTAC) 75 mg tablet Take 75 mg by mouth two (2) times a day.  calcium-cholecalciferol, d3, (CALCIUM 600 + D) 600-125 mg-unit tab Take  by mouth.  folic acid (FOLVITE) 1 mg tablet Take  by mouth daily.  Lamotrigine (LAMICTAL XR) 200 mg tr24 Take 300 mg by mouth two (2) times a day.  methylPREDNISolone (MEDROL DOSEPACK) 4 mg tablet As directed 1 Dose Pack 0    lacosamide (VIMPAT) 200 mg tab tablet Take 200 mg by mouth two (2) times a day. Allergies   Allergen Reactions    Amoxicillin Hives    Levaquin [Levofloxacin] Seizures     Seizure - lowered seizure threshold    Sulfa (Sulfonamide Antibiotics) Other (comments)     Vision and balance problem     Social History     Social History    Marital status: SINGLE     Spouse name: N/A    Number of children: N/A    Years of education: N/A     Occupational History    Not on file. Social History Main Topics    Smoking status: Never Smoker    Smokeless tobacco: Never Used    Alcohol use Yes      Comment: 1/wk    Drug use: No    Sexual activity: No     Other Topics Concern    Not on file     Social History Narrative     Review of Systems  A comprehensive review of systems was negative except for that written in the HPI.     Objective:     Visit Vitals    /69 (BP 1 Location: Left arm, BP Patient Position: Sitting)    Pulse 75    Temp 97.2 °F (36.2 °C) (Oral)    Resp 18    Ht 5' 8\" (1.727 m)    Wt 141 lb (64 kg)    SpO2 100%    BMI 21.44 kg/m2     General:  alert, cooperative, no distress, appears stated age   Head:  frontal sinus tenderness bilateral   Eyes: negative   Ears: abnormal TM AD - dull, bulging    Sinus tender: present   Mouth:  Lips, mucosa, and tongue normal. Teeth and gums normal   Neck: supple, symmetrical, trachea midline, mild anterior cervical adenopathy, thyroid: not enlarged, symmetric, no tenderness/mass/nodules, no carotid bruit and no JVD. Lungs: clear to auscultation bilaterally        Assessment:     Acute bacterial sinusitis    Plan:     1. pt has taken sudafed without relief   Continue all allergy meds already prescribed   2. Doxy 100mg bid x 10 days extensive history with recurrent sinusitis will do longer course to cover   3. Nasal saline rinses as needed for congestion.   4. Follow-up with PCP  if symptoms worsen or persist.

## 2017-10-05 NOTE — MR AVS SNAPSHOT
Visit Information Date & Time Provider Department Dept. Phone Encounter #  
 10/5/2017  3:20 PM Kevin Whalen, [facility] [phone] [id number] Follow-up Instructions Return if symptoms worsen or fail to improve. Upcoming Health Maintenance Date Due  
 PAP AKA CERVICAL CYTOLOGY 4/19/2012 HPV AGE 9Y-26Y (2 of 3 - Female 3 Dose Series) 3/23/2016 INFLUENZA AGE 9 TO ADULT 8/1/2017 Pneumococcal 19-64 Highest Risk (3 of 3 - PCV13) 8/22/2017 DTaP/Tdap/Td series (3 - Td) 10/21/2021 Allergies as of 10/5/2017  Review Complete On: 10/5/2017 By: Kevin Whalen NP Severity Noted Reaction Type Reactions Amoxicillin High 04/01/2014   Intolerance Hives Levaquin [Levofloxacin] High 01/27/2016   Side Effect Seizures Seizure - lowered seizure threshold Sulfa (Sulfonamide Antibiotics)  10/20/2015    Other (comments) Vision and balance problem Current Immunizations  Reviewed on 12/9/2016 Name Date Hep A Vaccine (Adult) 5/24/2012, 10/21/2011 Hep B, Adol/Ped 4/20/1993, 10/15/1992 IPV 10/21/2011, 4/20/1995 Influenza Vaccine 10/20/2015 10:59 AM  
 Influenza Vaccine (Quad) PF  Incomplete Influenza Vaccine PF 10/29/2014  4:04 PM  
 Japanese Encephalitis Vaccine (SQ) 11/23/2011, 10/21/2011 MMR 4/20/1995, 7/20/1992 Meningococcal (MPSV4) Vaccine 11/15/2008 Pneumococcal Polysaccharide (PPSV-23) 8/22/2016, 7/1/2011 Td 10/7/2005 Tdap 10/21/2011 Typhoid Vi Capsular Polysaccharide Vaccine 10/21/2011 Not reviewed this visit You Were Diagnosed With   
  
 Codes Comments Encounter for immunization    -  Primary ICD-10-CM: V18 ICD-9-CM: V03.89 Acute non-recurrent sinusitis, unspecified location     ICD-10-CM: J01.90 ICD-9-CM: 461.9 Vitals  BP Pulse Temp Resp Height(growth percentile) Weight(growth percentile)  
 119/69 (BP 1 Location: Left arm, BP Patient Position: Sitting) 75 97.2 °F (36.2 °C) (Oral) 18 5' 8\" (1.727 m) 141 lb (64 kg) SpO2 BMI OB Status Smoking Status 100% 21.44 kg/m2 Chemically Induced Never Smoker BMI and BSA Data Body Mass Index Body Surface Area  
 21.44 kg/m 2 1.75 m 2 Preferred Pharmacy Pharmacy Name Phone [facility] [address]  [address] [phone] Your Updated Medication List  
  
   
This list is accurate as of: 10/5/17  3:48 PM.  Always use your most recent med list.  
  
  
  
  
 ALLER-MARSHALL PO Take  by mouth. BREO ELLIPTA 100-25 mcg/dose inhaler Generic drug:  fluticasone-vilanterol Take 1 Puff by inhalation daily. CALCIUM 600 + D 600-125 mg-unit Tab Generic drug:  calcium-cholecalciferol (d3) Take  by mouth. cholecalciferol 5,000 unit capsule Commonly known as:  VITAMIN D3 Take 1 Cap by mouth daily. doxycycline 100 mg tablet Commonly known as:  ADOXA Take 1 Tab by mouth two (2) times a day for 10 days. folic acid 1 mg tablet Commonly known as:  Google Take  by mouth daily. LaMICtal  mg Tr24 ER tablet Generic drug:  lamoTRIgine Take 300 mg by mouth two (2) times a day. methylPREDNISolone 4 mg tablet Commonly known as:  Emory Rubins As directed  
  
 ondansetron 8 mg disintegrating tablet Commonly known as:  ZOFRAN ODT Take 1 Tab by mouth every twelve (12) hours as needed for Nausea. PROAIR RESPICLICK 90 mcg/actuation Aepb Generic drug:  albuterol sulfate Take 90 mcg by mouth four (4) times daily. raNITIdine 75 mg tablet Commonly known as:  ZANTAC Take 75 mg by mouth two (2) times a day. SUMAtriptan 20 mg/actuation nasal spray Commonly known as:  IMITREX  
1 Spray as needed for Migraine. VIMPAT 200 mg Tab tablet Generic drug:  lacosamide Take 200 mg by mouth two (2) times a day. zonisamide 100 mg capsule Commonly known as:  Dorothye Sicard Take 400 mg by mouth two (2) times a day. Prescriptions Sent to Pharmacy Refills  
 doxycycline (ADOXA) 100 mg tablet 0 Sig: Take 1 Tab by mouth two (2) times a day for 10 days. Class: Normal  
 [facility] [address] [place] [address] PKY  [facility] #: [phone] Route: Oral  
  
We Performed the Following INFLUENZA VIRUS VAC QUAD,SPLIT,PRESV FREE SYRINGE IM [id number] CPT(R)] WA IMMUNIZ ADMIN,1 SINGLE/COMB VAC/TOXOID [id number] CPT(R)] Follow-up Instructions Return if symptoms worsen or fail to improve. Patient Instructions Vaccine Information Statement Influenza (Flu) Vaccine (Inactivated or Recombinant): What you need to know Many Vaccine Information Statements are available in [place] and other languages. See www.immunize.org/vis Hojas de Información Sobre Vacunas están disponibles en Español y en muchos otros idiomas. Visite www.immunize.org/vis 1. Why get vaccinated? Influenza (flu) is a contagious disease that spreads around the [place] every year, usually between October and May. Flu is caused by influenza viruses, and is spread mainly by coughing, sneezing, and close contact. Anyone can get flu. Flu strikes suddenly and can last several days. Symptoms vary by age, but can include: 
 fever/chills  sore throat  muscle aches  fatigue  cough  headache  runny or stuffy nose Flu can also lead to pneumonia and blood infections, and cause diarrhea and seizures in children. If you have a medical condition, such as heart or lung disease, flu can make it worse. Flu is more dangerous for some people. Infants and young children, people 72years of age and older, pregnant women, and people with certain health conditions or a weakened immune system are at greatest risk. Each year thousands of people in the [facility] die from flu, and many more are hospitalized. Flu vaccine can: 
 keep you from getting flu, 
 make flu less severe if you do get it, and 
 keep you from spreading flu to your family and other people. 2. Inactivated and recombinant flu vaccines A dose of flu vaccine is recommended every flu season. Children 6 months through 6years of age may need two doses during the same flu season. Everyone else needs only one dose each flu season. Some inactivated flu vaccines contain a very small amount of a mercury-based preservative called thimerosal. Studies have not shown thimerosal in vaccines to be harmful, but flu vaccines that do not contain thimerosal are available. There is no live flu virus in flu shots. They cannot cause the flu. There are many flu viruses, and they are always changing. Each year a new flu vaccine is made to protect against three or four viruses that are likely to cause disease in the upcoming flu season. But even when the vaccine doesnt exactly match these viruses, it may still provide some protection Flu vaccine cannot prevent: 
 flu that is caused by a virus not covered by the vaccine, or 
 illnesses that look like flu but are not. It takes about 2 weeks for protection to develop after vaccination, and protection lasts through the flu season. 3. Some people should not get this vaccine Tell the person who is giving you the vaccine:  If you have any severe, life-threatening allergies. If you ever had a life-threatening allergic reaction after a dose of flu vaccine, or have a severe allergy to any part of this vaccine, you may be advised not to get vaccinated. Most, but not all, types of flu vaccine contain a small amount of egg protein.  If you ever had Guillain-Barré Syndrome (also called GBS). Some people with a history of GBS should not get this vaccine.  This should be discussed with your doctor.  If you are not feeling well. It is usually okay to get flu vaccine when you have a mild illness, but you might be asked to come back when you feel better. 4. Risks of a vaccine reaction With any medicine, including vaccines, there is a chance of reactions. These are usually mild and go away on their own, but serious reactions are also possible. Most people who get a flu shot do not have any problems with it. Minor problems following a flu shot include:  
 soreness, redness, or swelling where the shot was given  hoarseness  sore, red or itchy eyes  cough  fever  aches  headache  itching  fatigue If these problems occur, they usually begin soon after the shot and last 1 or 2 days. More serious problems following a flu shot can include the following:  There may be a small increased risk of Guillain-Barré Syndrome (GBS) after inactivated flu vaccine. This risk has been estimated at 1 or 2 additional cases per million people vaccinated. This is much lower than the risk of severe complications from flu, which can be prevented by flu vaccine.  Young children who get the flu shot along with pneumococcal vaccine (PCV13) and/or DTaP vaccine at the same time might be slightly more likely to have a seizure caused by fever. Ask your doctor for more information. Tell your doctor if a child who is getting flu vaccine has ever had a seizure. Problems that could happen after any injected vaccine:  People sometimes faint after a medical procedure, including vaccination. Sitting or lying down for about 15 minutes can help prevent fainting, and injuries caused by a fall. Tell your doctor if you feel dizzy, or have vision changes or ringing in the ears.  Some people get severe pain in the shoulder and have difficulty moving the arm where a shot was given. This happens very rarely.  Any medication can cause a severe allergic reaction. Such reactions from a vaccine are very rare, estimated at about 1 in a million doses, and would happen within a few minutes to a few hours after the vaccination. As with any medicine, there is a very remote chance of a vaccine causing a serious injury or death. The safety of vaccines is always being monitored. For more information, visit: www.cdc.gov/vaccinesafety/ 
 
 
The [facility] Vaccine Injury Compensation Program (VICP) is a federal program that was created to compensate people who may have been injured by certain vaccines. Persons who believe they may have been injured by a vaccine can learn about the program and about filing a claim by calling 5-453.656.3830 or visiting the [employer] website at www.[facility].gov/vaccinecompensation. There is a time limit to file a claim for compensation. 7. How can I learn more?  Ask your healthcare provider. He or she can give you the vaccine package insert or suggest other sources of information.  Call your local or state health department.  Contact the Centers for Disease Control and Prevention (CDC): 
- Call 0-421.729.7684 (1-800-CDC-INFO) or 
- Visit CDCs website at www.cdc.gov/flu Vaccine Information Statement Inactivated Influenza Vaccine 8/7/2015 
Maricel Robertson [id number] Department of [facility] and [employer] Centers for Disease Control and Prevention Office Use Only Sinusitis: Care Instructions Your Care Instructions Sinusitis is an infection of the lining of the sinus cavities in your head. Sinusitis often follows a cold. It causes pain and pressure in your head and face. In most cases, sinusitis gets better on its own in 1 to 2 weeks. But some mild symptoms may last for several weeks. Sometimes antibiotics are needed. Follow-up care is a key part of your treatment and safety. Be sure to make and go to all appointments, and call your doctor if you are having problems. It's also a good idea to know your test results and keep a list of the medicines you take. How can you care for yourself at home? · Take an over-the-counter pain medicine, such as acetaminophen (Tylenol), ibuprofen (Advil, Motrin), or naproxen (Aleve). Read and follow all instructions on the label. · If the doctor prescribed antibiotics, take them as directed. Do not stop taking them just because you feel better. You need to take the full course of antibiotics. · Be careful when taking over-the-counter cold or flu medicines and Tylenol at the same time. Many of these medicines have acetaminophen, which is Tylenol. Read the labels to make sure that you are not taking more than the recommended dose. Too much acetaminophen (Tylenol) can be harmful. · Breathe warm, moist air from a steamy shower, a hot bath, or a sink filled with hot water. Avoid cold, dry air. Using a humidifier in your home may help. Follow the directions for cleaning the machine. · Use saline (saltwater) nasal washes to help keep your nasal passages open and wash out mucus and bacteria. You can buy saline nose drops at a grocery store or drugstore. Or you can make your own at home by adding 1 teaspoon of salt and 1 teaspoon of baking soda to 2 cups of distilled water. If you make your own, fill a bulb syringe with the solution, insert the tip into your nostril, and squeeze gently. Dayna Rodgers your nose. · Put a hot, wet towel or a warm gel pack on your face 3 or 4 times a day for 5 to 10 minutes each time. · Try a decongestant nasal spray like oxymetazoline (Afrin). Do not use it for more than 3 days in a row. Using it for more than 3 days can make your congestion worse. When should you call for help? Call your doctor now or seek immediate medical care if: 
· You have new or worse swelling or redness in your face or around your eyes. · You have a new or higher fever. Watch closely for changes in your health, and be sure to contact your doctor if: 
· You have new or worse facial pain. · The mucus from your nose becomes thicker (like pus) or has new blood in it. · You are not getting better as expected. Where can you learn more? Go to http://dayna-emery.info/. Enter V720 in the search box to learn more about \"Sinusitis: Care Instructions. \" Current as of: July 29, 2016 Content Version: 11.3 © 7952-2312 [employer]. Care instructions adapted under license by [employer] (which disclaims liability or warranty for this information). If you have questions about a medical condition or this instruction, always ask your healthcare professional. [address] any warranty or liability for your use of this information. Introducing [facility] & HEALTH SERVICES! Dear Haley Merino: Thank you for requesting a [employer] account. Our records indicate that you already have an active [employer] account.   You can access your account anytime at [email or web address] Did you know that you can access your hospital and ER discharge instructions at any time in momondo? You can also review all of your test results from your hospital stay or ER visit. Additional Information If you have questions, please visit the Frequently Asked Questions section of the momondo website at [email or web address] Remember, momondo is NOT to be used for urgent needs. For medical emergencies, dial 911. Now available from your iPhone and Android! Please provide this summary of care documentation to your next provider. Your primary care clinician is listed as Mohit Quiñones. If you have any questions after today's visit, please call [phone].

## 2017-12-01 ENCOUNTER — OFFICE VISIT (OUTPATIENT)
Dept: FAMILY MEDICINE CLINIC | Age: 27
End: 2017-12-01

## 2017-12-01 VITALS
HEIGHT: 68 IN | DIASTOLIC BLOOD PRESSURE: 80 MMHG | HEART RATE: 99 BPM | WEIGHT: 135 LBS | OXYGEN SATURATION: 99 % | RESPIRATION RATE: 16 BRPM | TEMPERATURE: 97.9 F | SYSTOLIC BLOOD PRESSURE: 120 MMHG | BODY MASS INDEX: 20.46 KG/M2

## 2017-12-01 DIAGNOSIS — J01.00 ACUTE NON-RECURRENT MAXILLARY SINUSITIS: Primary | ICD-10-CM

## 2017-12-01 RX ORDER — FLUTICASONE FUROATE AND VILANTEROL 200; 25 UG/1; UG/1
1 POWDER RESPIRATORY (INHALATION) DAILY
COMMUNITY

## 2017-12-01 RX ORDER — AZITHROMYCIN 500 MG/1
500 TABLET, FILM COATED ORAL DAILY
Qty: 10 TAB | Refills: 0 | Status: SHIPPED | OUTPATIENT
Start: 2017-12-01 | End: 2017-12-11

## 2017-12-01 NOTE — PROGRESS NOTES
Assessment/Plan:    *Diagnoses and all orders for this visit:    1. Acute non-recurrent maxillary sinusitis    Other orders  -     azithromycin (ZITHROMAX) 500 mg tab; Take 1 Tab by mouth daily for 10 days. Will call if she does not improve. The plan was discussed with the patient. The patient verbalized understanding and is in agreement with the plan. All medication potential side effects were discussed with the patient.    -------------------------------------------------------------------------------------------------------------------        Rebeca Castelan is a 33 y.o. female and presents with Sinus Pain (started 11.18.17 ) and Nasal Congestion         Subjective:  Pt here for sinus congestion, no fevers, no chest symptoms, no cough. She started feeling unwell 2 weeks ago, has been monitoring things but not getting better. Pt is Ig A deficient and typically does need an Abt to get over an infection. ROS:  Constitutional: No recent weight change. No weakness/fatigue. No f/c. Skin: No rashes, change in nails/hair, itching   HENT: No HA, dizziness. No hearing loss/tinnitus. No nasal congestion/discharge. Eyes: No change in vision, double/blurred vision or eye pain/redness. Cardiovascular: No CP/palpitations. No JOHN/orthopnea/PND. Respiratory: No cough/sputum, dyspnea, wheezing. Gastointestinal: No dysphagia, reflux. No n/v. No constipation/diarrhea. No melena/rectal bleeding. Genitourinary: No dysuria, urinary hesitancy, nocturia, hematuria. No incontinence. Musculoskeletal: No joint pain/stiffness. No muscle pain/tenderness. Endo: No heat/cold intolerance, no polyuria/polydypsia. Heme: No h/o anemia. No easy bleeding/bruising. Allergy/Immunology: No seasonal rhinitis. Denies frequent colds, sinus/ear infections. Neurological: No seizures/numbness/weakness. No paresthesias. Psychiatric:  No depression, anxiety.      The problem list was updated as a part of today's visit. Patient Active Problem List   Diagnosis Code    Seizures ([facility] [address] R56.9    IgA deficiency ([facility] [address] D80.2    Asthma J45.909    Acne vulgaris L70.0    Routine adult health maintenance Z00.00    Dizziness R42    Migraine without aura and without status migrainosus, not intractable G43.009    Family history of thyroid disease in sister - Hashimoto's Z80.51       The PSH, FH were reviewed. SH:  Social History   Substance Use Topics    Smoking status: Never Smoker    Smokeless tobacco: Never Used    Alcohol use Yes      Comment: 1/wk       Medications/Allergies:  Current Outpatient Prescriptions on File Prior to Visit   Medication Sig Dispense Refill    albuterol sulfate (PROAIR RESPICLICK) 90 mcg/actuation aepb Take 90 mcg by mouth four (4) times daily.  SUMAtriptan (IMITREX) 20 mg/actuation nasal spray 1 Spray as needed for Migraine.  ondansetron (ZOFRAN ODT) 8 mg disintegrating tablet Take 1 Tab by mouth every twelve (12) hours as needed for Nausea. 20 Tab 4    cholecalciferol (VITAMIN D3) 5,000 unit capsule Take 1 Cap by mouth daily. 90 Cap 3    zonisamide (ZONEGRAN) 100 mg capsule Take 200 mg by mouth two (2) times a day.  CETIRIZINE HCL (ALLER-MARSHALL PO) Take  by mouth.  ranitidine (ZANTAC) 75 mg tablet Take 75 mg by mouth two (2) times a day.  calcium-cholecalciferol, d3, (CALCIUM 600 + D) 600-125 mg-unit tab Take  by mouth.  folic acid (FOLVITE) 1 mg tablet Take  by mouth daily.  Lamotrigine (LAMICTAL XR) 200 mg tr24 Take 300 mg by mouth two (2) times a day. No current facility-administered medications on file prior to visit.          Allergies   Allergen Reactions    Amoxicillin Hives    Levaquin [Levofloxacin] Seizures     Seizure - lowered seizure threshold    Sulfa (Sulfonamide Antibiotics) Other (comments)     Vision and balance problem         Health Maintenance:   Health Maintenance   Topic Date Due    PAP AKA CERVICAL CYTOLOGY 04/19/2012    HPV AGE 9Y-26Y (2 of 3 - Female 3 Dose Series) 03/23/2016    Pneumococcal 19-64 Highest Risk (3 of 3 - PCV13) 08/22/2017    DTaP/Tdap/Td series (3 - Td) 10/21/2021    Influenza Age 9 to Adult  Completed       Objective:  Visit Vitals    /80 (BP 1 Location: Left arm, BP Patient Position: Sitting)    Pulse 99    Temp 97.9 °F (36.6 °C)    Resp 16    Ht 5' 8\" (1.727 m)    Wt 135 lb (61.2 kg)    SpO2 99%    BMI 20.53 kg/m2          Nurses notes and VS reviewed. Physical Examination: General appearance - alert, well appearing, and in no distress  Ears - bilateral TM's and external ear canals normal  Mouth - erythematous  Neck - supple, no significant adenopathy  Chest - clear to auscultation, no wheezes, rales or rhonchi, symmetric air entry  Heart - normal rate, regular rhythm, normal S1, S2, no murmurs, rubs, clicks or gallops        Labwork and Ancillary Studies:    CBC w/Diff  Lab Results   Component Value Date/Time    WBC 9.2 02/14/2017 10:01 AM    HGB 14.2 02/14/2017 10:01 AM    PLATELET 225 72/50/7309 10:01 AM         Basic Metabolic Profile  Lab Results   Component Value Date/Time    Sodium 143 02/14/2017 10:01 AM    Potassium 5.0 02/14/2017 10:01 AM    Chloride 101 02/14/2017 10:01 AM    CO2 25 02/14/2017 10:01 AM    Anion gap 17.0 02/14/2017 10:01 AM    Glucose 89 02/14/2017 10:01 AM    BUN 10 02/14/2017 10:01 AM    Creatinine 0.7 02/14/2017 10:01 AM    Calcium 9.9 02/14/2017 10:01 AM         LFT  Lab Results   Component Value Date/Time    ALT (SGPT) 19 02/14/2017 10:01 AM    AST (SGOT) 18 02/14/2017 10:01 AM    Alk.  phosphatase 82 02/14/2017 10:01 AM    Bilirubin, direct <0.2 10/02/2014 02:40 PM    Bilirubin, total 0.4 02/14/2017 10:01 AM         Cholesterol  Lab Results   Component Value Date/Time    Cholesterol, total 154 12/02/2016 07:18 AM    HDL Cholesterol 65 12/02/2016 07:18 AM    LDL, calculated 82 12/02/2016 07:18 AM    Triglyceride 35 12/02/2016 07:18 AM

## 2017-12-01 NOTE — PATIENT INSTRUCTIONS
Sinusitis: Care Instructions  Your Care Instructions    Sinusitis is an infection of the lining of the sinus cavities in your head. Sinusitis often follows a cold. It causes pain and pressure in your head and face. In most cases, sinusitis gets better on its own in 1 to 2 weeks. But some mild symptoms may last for several weeks. Sometimes antibiotics are needed. Follow-up care is a key part of your treatment and safety. Be sure to make and go to all appointments, and call your doctor if you are having problems. It's also a good idea to know your test results and keep a list of the medicines you take. How can you care for yourself at home? · Take an over-the-counter pain medicine, such as acetaminophen (Tylenol), ibuprofen (Advil, Motrin), or naproxen (Aleve). Read and follow all instructions on the label. · If the doctor prescribed antibiotics, take them as directed. Do not stop taking them just because you feel better. You need to take the full course of antibiotics. · Be careful when taking over-the-counter cold or flu medicines and Tylenol at the same time. Many of these medicines have acetaminophen, which is Tylenol. Read the labels to make sure that you are not taking more than the recommended dose. Too much acetaminophen (Tylenol) can be harmful. · Breathe warm, moist air from a steamy shower, a hot bath, or a sink filled with hot water. Avoid cold, dry air. Using a humidifier in your home may help. Follow the directions for cleaning the machine. · Use saline (saltwater) nasal washes to help keep your nasal passages open and wash out mucus and bacteria. You can buy saline nose drops at a grocery store or drugstore. Or you can make your own at home by adding 1 teaspoon of salt and 1 teaspoon of baking soda to 2 cups of distilled water. If you make your own, fill a bulb syringe with the solution, insert the tip into your nostril, and squeeze gently. Priscila [place] your nose.   · Put a hot, wet towel or a warm gel pack on your face 3 or 4 times a day for 5 to 10 minutes each time. · Try a decongestant nasal spray like oxymetazoline (Afrin). Do not use it for more than 3 days in a row. Using it for more than 3 days can make your congestion worse. When should you call for help? Call your doctor now or seek immediate medical care if:  ? · You have new or worse swelling or redness in your face or around your eyes. ? · You have a new or higher fever. ? Watch closely for changes in your health, and be sure to contact your doctor if:  ? · You have new or worse facial pain. ? · The mucus from your nose becomes thicker (like pus) or has new blood in it. ? · You are not getting better as expected. Where can you learn more? Go to http://dayna-emery.info/. Enter O402 in the search box to learn more about \"Sinusitis: Care Instructions. \"  Current as of: May 12, 2017  Content Version: 11.4  © 9763-0287 [employer]. Care instructions adapted under license by [employer] (which disclaims liability or warranty for this information). If you have questions about a medical condition or this instruction, always ask your healthcare professional. [address] any warranty or liability for your use of this information.

## 2017-12-01 NOTE — PROGRESS NOTES
Bella Copeland is a 33 y.o. female (: 1990) presenting to address:    Chief Complaint   Patient presents with    Sinus Pain     started 17     Nasal Congestion       Vitals:    17 0808   BP: 120/80   Pulse: 99   Resp: 16   Temp: 97.9 °F (36.6 °C)   SpO2: 99%   Weight: 135 lb (61.2 kg)   Height: 5' 8\" (1.727 m)   PainSc:   2   PainLoc: Face       Hearing/Vision:   No exam data present    Learning Assessment:     Learning Assessment 2015   PRIMARY LEARNER Patient   HIGHEST LEVEL OF EDUCATION - PRIMARY LEARNER  > 4 YEARS OF COLLEGE   BARRIERS PRIMARY LEARNER NONE   PRIMARY LANGUAGE ENGLISH   LEARNER PREFERENCE PRIMARY OTHER (COMMENT)   ANSWERED BY patient   RELATIONSHIP SELF     Depression Screening:     PHQ over the last two weeks 2017   Little interest or pleasure in doing things Not at all   Feeling down, depressed or hopeless Not at all   Total Score PHQ 2 0     Fall Risk Assessment:   No flowsheet data found. Abuse Screening:     Abuse Screening Questionnaire 2016   Do you ever feel afraid of your partner? N   Are you in a relationship with someone who physically or mentally threatens you? N   Is it safe for you to go home? Y     Coordination of Care Questionaire:   1. Have you been to the ER, urgent care clinic since your last visit? Hospitalized since your last visit? NO    2. Have you seen or consulted any other health care providers outside of the [address] [place] [address] since your last visit? Include any pap smears or colon screening. NO    Advanced Directive:   1. Do you have an Advanced Directive? NO    2. Would you like information on Advanced Directives?  NO

## 2017-12-01 NOTE — MR AVS SNAPSHOT
Visit Information Date & Time Provider Department Dept. Phone Encounter #  
 12/1/2017  8:15 AM Rosemary Lassiter [facility] [phone] [id number] Upcoming Health Maintenance Date Due  
 PAP AKA CERVICAL CYTOLOGY 4/19/2012 HPV AGE 9Y-26Y (2 of 3 - Female 3 Dose Series) 3/23/2016 Pneumococcal 19-64 Highest Risk (3 of 3 - PCV13) 8/22/2017 DTaP/Tdap/Td series (3 - Td) 10/21/2021 Allergies as of 12/1/2017  Review Complete On: 12/1/2017 By: Leta Carrasco LPN Severity Noted Reaction Type Reactions Amoxicillin High 04/01/2014   Intolerance Hives Levaquin [Levofloxacin] High 01/27/2016   Side Effect Seizures Seizure - lowered seizure threshold Sulfa (Sulfonamide Antibiotics)  10/20/2015    Other (comments) Vision and balance problem Current Immunizations  Reviewed on 12/9/2016 Name Date Hep A Vaccine (Adult) 5/24/2012, 10/21/2011 Hep B, Adol/Ped 4/20/1993, 10/15/1992 IPV 10/21/2011, 4/20/1995 Influenza Vaccine 10/20/2015 10:59 AM  
 Influenza Vaccine (Quad) PF 10/5/2017  3:31 PM  
 Influenza Vaccine PF 10/29/2014  4:04 PM  
 Japanese Encephalitis Vaccine (SQ) 11/23/2011, 10/21/2011 MMR 4/20/1995, 7/20/1992 Meningococcal (MPSV4) Vaccine 11/15/2008 Pneumococcal Polysaccharide (PPSV-23) 8/22/2016, 7/1/2011 Td 10/7/2005 Tdap 10/21/2011 Typhoid Vi Capsular Polysaccharide Vaccine 10/21/2011 Not reviewed this visit You Were Diagnosed With   
  
 Codes Comments Acute non-recurrent maxillary sinusitis    -  Primary ICD-10-CM: J01.00 ICD-9-CM: 461.0 Vitals BP Pulse Temp Resp Height(growth percentile) Weight(growth percentile) 120/80 (BP 1 Location: Left arm, BP Patient Position: Sitting) 99 97.9 °F (36.6 °C) 16 5' 8\" (1.727 m) 135 lb (61.2 kg) SpO2 BMI OB Status Smoking Status 99% 20.53 kg/m2 Chemically Induced Never Smoker Vitals History BMI and BSA Data Body Mass Index Body Surface Area 20.53 kg/m 2 1.71 m 2 Preferred Pharmacy Pharmacy Name Phone [facility] [phone]  [address] [phone] Your Updated Medication List  
  
   
This list is accurate as of: 12/1/17  8:43 AM.  Always use your most recent med list.  
  
  
  
  
 ALLER-MARSHALL PO Take  by mouth. azithromycin 500 mg Tab Commonly known as:  Jorge Raghav Take 1 Tab by mouth daily for 10 days. BREO ELLIPTA 200-25 mcg/dose inhaler Generic drug:  fluticasone-vilanterol Take 1 Puff by inhalation daily. CALCIUM 600 + D 600-125 mg-unit Tab Generic drug:  calcium-cholecalciferol (d3) Take  by mouth. cholecalciferol 5,000 unit capsule Commonly known as:  VITAMIN D3 Take 1 Cap by mouth daily. folic acid 1 mg tablet Commonly known as:  Google Take  by mouth daily. LaMICtal  mg Tr24 ER tablet Generic drug:  lamoTRIgine Take 300 mg by mouth two (2) times a day. ondansetron 8 mg disintegrating tablet Commonly known as:  ZOFRAN ODT Take 1 Tab by mouth every twelve (12) hours as needed for Nausea. PROAIR RESPICLICK 90 mcg/actuation Aepb Generic drug:  albuterol sulfate Take 90 mcg by mouth four (4) times daily. raNITIdine 75 mg tablet Commonly known as:  ZANTAC Take 75 mg by mouth two (2) times a day. SUMAtriptan 20 mg/actuation nasal spray Commonly known as:  IMITREX  
1 Spray as needed for Migraine. zonisamide 100 mg capsule Commonly known as:  Kasey Hodges Take 200 mg by mouth two (2) times a day. Prescriptions Sent to Pharmacy Refills  
 azithromycin (ZITHROMAX) 500 mg tab 0 Sig: Take 1 Tab by mouth daily for 10 days. Class: Normal  
 Pharmacy: [facility] [address] [place] [address] [address] [place] [address] [address]  [address] Ph #: [phone]  Route: Oral  
  
 Patient Instructions Sinusitis: Care Instructions Your Care Instructions Sinusitis is an infection of the lining of the sinus cavities in your head. Sinusitis often follows a cold. It causes pain and pressure in your head and face. In most cases, sinusitis gets better on its own in 1 to 2 weeks. But some mild symptoms may last for several weeks. Sometimes antibiotics are needed. Follow-up care is a key part of your treatment and safety. Be sure to make and go to all appointments, and call your doctor if you are having problems. It's also a good idea to know your test results and keep a list of the medicines you take. How can you care for yourself at home? · Take an over-the-counter pain medicine, such as acetaminophen (Tylenol), ibuprofen (Advil, Motrin), or naproxen (Aleve). Read and follow all instructions on the label. · If the doctor prescribed antibiotics, take them as directed. Do not stop taking them just because you feel better. You need to take the full course of antibiotics. · Be careful when taking over-the-counter cold or flu medicines and Tylenol at the same time. Many of these medicines have acetaminophen, which is Tylenol. Read the labels to make sure that you are not taking more than the recommended dose. Too much acetaminophen (Tylenol) can be harmful. · Breathe warm, moist air from a steamy shower, a hot bath, or a sink filled with hot water. Avoid cold, dry air. Using a humidifier in your home may help. Follow the directions for cleaning the machine. · Use saline (saltwater) nasal washes to help keep your nasal passages open and wash out mucus and bacteria. You can buy saline nose drops at a grocery store or drugstore. Or you can make your own at home by adding 1 teaspoon of salt and 1 teaspoon of baking soda to 2 cups of distilled water. If you make your own, fill a bulb syringe with the solution, insert the tip into your nostril, and squeeze gently. Ruddy Rolls your nose. · Put a hot, wet towel or a warm gel pack on your face 3 or 4 times a day for 5 to 10 minutes each time. · Try a decongestant nasal spray like oxymetazoline (Afrin). Do not use it for more than 3 days in a row. Using it for more than 3 days can make your congestion worse. When should you call for help? Call your doctor now or seek immediate medical care if: 
? · You have new or worse swelling or redness in your face or around your eyes. ? · You have a new or higher fever. ? Watch closely for changes in your health, and be sure to contact your doctor if: 
? · You have new or worse facial pain. ? · The mucus from your nose becomes thicker (like pus) or has new blood in it. ? · You are not getting better as expected. Where can you learn more? Go to http://dayna-emery.info/. Enter G492 in the search box to learn more about \"Sinusitis: Care Instructions. \" Current as of: May 12, 2017 Content Version: 11.4 © 6897-6011 [employer]. Care instructions adapted under license by [employer] (which disclaims liability or warranty for this information). If you have questions about a medical condition or this instruction, always ask your healthcare professional. [address] any warranty or liability for your use of this information. Introducing [facility]! Dear Jan Bhakta: Thank you for requesting a [employer] account. Our records indicate that you already have an active [employer] account. You can access your account anytime at [email or web address] [email or web address] Did you know that you can access your hospital and ER discharge instructions at any time in [employer]? You can also review all of your test results from your hospital stay or ER visit. Additional Information If you have questions, please visit the Frequently Asked Questions section of the [employer] website at [email or web address]/. Remember, MyChart is NOT to be used for urgent needs. For medical emergencies, dial 911. Now available from your iPhone and Android! Please provide this summary of care documentation to your next provider. Your primary care clinician is listed as Roscoe Ferrell. If you have any questions after today's visit, please call [phone].

## 2017-12-10 DIAGNOSIS — R11.2 NON-INTRACTABLE VOMITING WITH NAUSEA, UNSPECIFIED VOMITING TYPE: ICD-10-CM

## 2017-12-11 RX ORDER — ONDANSETRON 8 MG/1
TABLET, ORALLY DISINTEGRATING ORAL
Qty: 20 TAB | Refills: 0 | Status: SHIPPED | OUTPATIENT
Start: 2017-12-11 | End: 2018-05-23 | Stop reason: SDUPTHER

## 2017-12-21 ENCOUNTER — OFFICE VISIT (OUTPATIENT)
Dept: FAMILY MEDICINE CLINIC | Age: 27
End: 2017-12-21

## 2017-12-21 VITALS
DIASTOLIC BLOOD PRESSURE: 65 MMHG | TEMPERATURE: 97.7 F | OXYGEN SATURATION: 100 % | SYSTOLIC BLOOD PRESSURE: 114 MMHG | HEART RATE: 79 BPM | RESPIRATION RATE: 20 BRPM | BODY MASS INDEX: 20.37 KG/M2 | HEIGHT: 68 IN | WEIGHT: 134.4 LBS

## 2017-12-21 DIAGNOSIS — Z13.21 ENCOUNTER FOR VITAMIN DEFICIENCY SCREENING: ICD-10-CM

## 2017-12-21 DIAGNOSIS — Z13.0 SCREENING FOR ENDOCRINE/METABOLIC/IMMUNITY DISORDERS: ICD-10-CM

## 2017-12-21 DIAGNOSIS — Z13.29 SCREENING FOR ENDOCRINE/METABOLIC/IMMUNITY DISORDERS: ICD-10-CM

## 2017-12-21 DIAGNOSIS — Z13.89 SCREENING FOR BLOOD OR PROTEIN IN URINE: ICD-10-CM

## 2017-12-21 DIAGNOSIS — Z13.220 SCREENING, LIPID: ICD-10-CM

## 2017-12-21 DIAGNOSIS — R56.9 SEIZURES (HCC): Chronic | ICD-10-CM

## 2017-12-21 DIAGNOSIS — Z13.29 SCREENING FOR THYROID DISORDER: ICD-10-CM

## 2017-12-21 DIAGNOSIS — Z00.00 ROUTINE ADULT HEALTH MAINTENANCE: Primary | Chronic | ICD-10-CM

## 2017-12-21 DIAGNOSIS — Z13.228 SCREENING FOR ENDOCRINE/METABOLIC/IMMUNITY DISORDERS: ICD-10-CM

## 2017-12-21 DIAGNOSIS — Z13.1 SCREENING FOR DIABETES MELLITUS: ICD-10-CM

## 2017-12-21 RX ORDER — AZITHROMYCIN 250 MG/1
TABLET, FILM COATED ORAL
Refills: 0 | COMMUNITY
Start: 2017-12-15 | End: 2018-02-16 | Stop reason: SDUPTHER

## 2017-12-21 NOTE — PROGRESS NOTES
Madonna Early is a 33 y.o. female (: 1990) presenting to address:    Chief Complaint   Patient presents with    Well Woman       Vitals:    17 1501   Weight: 134 lb 6.4 oz (61 kg)   Height: 5' 8\" (1.727 m)   PainSc:   0 - No pain       Hearing/Vision:   No exam data present    Learning Assessment:     Learning Assessment 2015   PRIMARY LEARNER Patient   HIGHEST LEVEL OF EDUCATION - PRIMARY LEARNER  > 4 YEARS OF COLLEGE   BARRIERS PRIMARY LEARNER NONE   PRIMARY LANGUAGE ENGLISH   LEARNER PREFERENCE PRIMARY OTHER (COMMENT)   ANSWERED BY patient   RELATIONSHIP SELF     Depression Screening:     PHQ over the last two weeks 2017   Little interest or pleasure in doing things Not at all   Feeling down, depressed or hopeless Not at all   Total Score PHQ 2 0     Fall Risk Assessment:   No flowsheet data found. Abuse Screening:     Abuse Screening Questionnaire 2016   Do you ever feel afraid of your partner? N   Are you in a relationship with someone who physically or mentally threatens you? N   Is it safe for you to go home? Y     Coordination of Care Questionaire:   1. Have you been to the ER, urgent care clinic since your last visit? Hospitalized since your last visit? YES    2. Have you seen or consulted any other health care providers outside of the [address] [place] [address] since your last visit? Include any pap smears or colon screening. YES, Immunology [facility]    Advanced Directive:   1. Do you have an Advanced Directive? N0    2. Would you like information on Advanced Directives?  No

## 2017-12-21 NOTE — PROGRESS NOTES
PRE-VISIT PLANNING:     Future Appointments  Date Time Provider Gera Wheeleri   2017 3:00 PM MD CECILIA Spence (PCP is Aurelia Murray MD) is scheduled for an office visit with Aurelia Murray MD on 2017 for preventive visit. Encounter opened prior to visit to complete pre-visit planning, update and review pertinent sections in the chart. Last office visit with PCP was 17. Seen by Dr. Stevie Terry on 17 for sinusitis, 17 for URI and NP [facility] 10/5/17 for sinusitis. Rooming Nurse Items:    Pending items for provider to review with patient:    Health Maintenance Due   Topic Date Due    PAP AKA CERVICAL CYTOLOGY  2012    HPV AGE 9Y-26Y (2 of 3 - Female 3 Dose Series) 2016    Pneumococcal 19-64 Highest Risk (3 of 3 - PCV13) 2017          Internal Medicine/Primary Care  Preventive Care Visit  [facility]  [address], [place], [facility], [address]  Phone [phone]; Fax [phone]    Date of Service:  2017  Patient's Name & : Aurelia Murray     Assessment/Plan:       Aurelia Murray is a 33 y.o. female who was seen today for preventive visit. Doing well on new epilepsy meds, no seizures in > 6 months. Chronic medical issues stable, followed by neurology for epilepsy and pediatric immunology for IgA deficiency. Patient interested in re-trial of bactrim at some point as she had tolerated it in the past, only had an issue with it while on Vimpat for seizures. HM recommendations reviewed with patient. Body mass index is 20.44 kg/(m^2). Discussed the patient's BMI with her. Achieving/maintaining healthy weight/BMI recommended. Follow up weight/BMI at next visit. Diagnoses and all orders for this visit:    1. Routine adult health maintenance    2.  Seizures ([address]    3. Screening for endocrine/metabolic/immunity disorders  -     CBC WITH AUTOMATED DIFF; Future  -     METABOLIC PANEL, COMPREHENSIVE; Future  -     HEMOGLOBIN A1C WITH EAG; Future  -     LIPID PANEL; Future  -     TSH AND FREE T4; Future  -     URINALYSIS W/ RFLX MICROSCOPIC; Future  -     VITAMIN D, 25 HYDROXY; Future    4. Screening for diabetes mellitus  -     HEMOGLOBIN A1C WITH EAG; Future    5. Screening, lipid  -     LIPID PANEL; Future    6. Screening for thyroid disorder  -     TSH AND FREE T4; Future    7. Screening for blood or protein in urine  -     URINALYSIS W/ RFLX MICROSCOPIC; Future    8. Encounter for vitamin deficiency screening  -     VITAMIN D, 25 HYDROXY; Future        Patient Instructions:  (see AVS for BMI recommendations)    Fasting labs at [facility] at your next earliest convenience. Follow up with Madhuri Estevez MD yearly for well visit (30m). Please arrive 15 minutes prior to your scheduled appointment time. Call and request an earlier appointment if you have any new questions or concerns. Follow up with Madhuri Estevez MD yearly for preventive visit. Complete fasting labs 1-2 weeks prior to the appointment. Call and request an earlier appointment if you have any new questions or concerns. The patient states understanding of recommended treatment plan. Madhuri Estevez MD - Internal Medicine  12/23/2017, 3:03 PM    Subjective/Discussion:       CHIEF COMPLAINT:  Preventive visit/CPE    Discussed regular exercise: Y - sometimes  Discussed healthy diet:  Y - yes, losing weight unintentionally, trying to be mindful of eating consistently  Discussed sun protection:  Y  Discussed always wearing seatbelt in automobile:  Y  Discussed distracted driving, advised not to text while driving:  Y  Change to family history: N    Body mass index is 20.44 kg/(m^2). Weight has decreased 7#s since last October. Discussed the patient's BMI with her as well as recommendations for achieving/maintaining healthy weight/BMI. Follow up BMI/weight at next visit. No seizures for > 6 months. Currently on another course of abx (azithromycin) from her immunologist for recurrent sinus symptoms (chronic immunosuppression due to IgA deficiency).      Health Maintenance   Topic Date Due    PAP AKA CERVICAL CYTOLOGY  04/19/2012    HPV AGE 9Y-26Y (2 of 3 - Female 3 Dose Series) 03/23/2016    Pneumococcal 19-64 Highest Risk (3 of 3 - PCV13) 08/22/2017    DTaP/Tdap/Td series (3 - Td) 10/21/2021    Influenza Age 9 to Adult  Completed       Review of Systems (positives in bold)   General ROS:  chills, fatigue, fever, hot flashes, malaise, night sweats, sleep disturbance, weight gain, weight loss  Neurological ROS: behavioral changes, bowel and bladder control changes, confusion, dizziness, gait disturbance, headaches, impaired coordination/balance, memory loss, numbness/tingling, seizures, tremors, visual changes, focal weakness  Ophthalmic ROS: blurry vision, decreased vision, double vision, dry eyes, excessive tearing, eye pain, itchy eyes, loss of vision, photophobia, scotomata, uses contacts or glasses  ENT ROS: epistaxis, hearing change, nasal congestion, nasal discharge, oral lesions, sinus pain, sneezing, sore throat, tinnitus, vertigo, visual changes or vocal changes  Cardiovascular ROS: chest pain, dyspnea on exertion, edema, irregular heartbeat, loss of consciousness, murmur, orthopnea, palpitations, paroxysmal nocturnal dyspnea, rapid heart rate, shortness of breath  Respiratory ROS: cough, hemoptysis, orthopnea, pleuritic pain, shortness of breath, sputum changes, stridor, tachypnea or wheezing  Gastrointestinal ROS: abdominal pain, appetite loss, blood in stools, change in bowel habits, constipation, diarrhea, gas/bloating, heartburn, hematemesis, melena, nausea/vomiting, stool incontinence, swallowing difficulty/pain, early satiety  Genito-Urinary ROS: dysuria, urinary frequency, urinary urgency, urinary incontinence, hematuria, malodorous urine, flank pain, genital discharge, genital ulcers,  pelvic pain, change in menstrual cycle, dysmenorrhea, dyspareunia,  irregular/heavy menses  Endocrine ROS: breast changes, galactorrhea, hair pattern changes, hot flashes, malaise/lethargy, mood swings, palpitations, polydipsia/polyuria, skin changes, temperature intolerance, unexplained weight changes  Breast ROS:  galactorrhea, new or changing breast lumps, nipple changes, nipple discharge, skin changes on breast  Dermatological ROS: acne, dry skin, eczema, hair changes, lumps, mole changes, nail changes, pruritus, rash, skin lesion changes  Musculoskeletal ROS: gait disturbance, joint pain, joint stiffness, joint swelling, muscle pain, muscular weakness, joint buckling/instability, joint triggering  Hematological and Lymphatic ROS: bleeding problems, blood clots, bruising, fatigue, jaundice, night sweats, swollen lymph nodes, unexplained weight loss  Allergy and Immunology ROS: hives, itchy/watery eyes, nasal congestion, postnasal drip or seasonal allergies  Psychological ROS: anxiety, behavioral disorder, concentration difficulties, decreased libido, depression, disorientation, hallucinations, irritability, mood swings, obsessive thoughts, sleep disturbances, suicidal ideation or homicidal ideation      Orders Only on 03/02/2017   Component Date Value Ref Range Status    Lamotrigine 03/02/2017 5.8  2.0 - 20.0 ug/mL Final    Comment:                                 Detection Limit = 1.0  Performed at:  [address]  [id number]  : Suresh Riggs MD, Phone:  [phone]     Office Visit on 02/14/2017   Component Date Value Ref Range Status    WBC 02/14/2017 9.2  4.0 - 11.0 K/uL Final    RBC 02/14/2017 4.25  3.80 - 5.20 M/uL Final    HGB 02/14/2017 14.2  11.7 - 15.5 g/dL Final    HCT 02/14/2017 41.6  35.1 - 46.5 % Final    MCV 02/14/2017 98* 80 - 95 fL Final    MCH 02/14/2017 33  26 - 34 pg Final    MCHC 02/14/2017 34  32 - 36 g/dL Final    RDW 02/14/2017 11.8  10.0 - 16.0 % Final    PLATELET 64/56/8202 235  140 - 440 K/uL Final    MPV 02/14/2017 9.5  6.0 - 10.8 fL Final    NEUTROPHILS 02/14/2017 70  40 - 75 % Final    Lymphocytes 02/14/2017 20* 27 - 45 % Final    MONOCYTES 02/14/2017 7  3 - 9 % Final    EOSINOPHILS 02/14/2017 2  0 - 6 % Final    BASOPHILS 02/14/2017 0  0 - 2 % Final    ABS. NEUTROPHILS 02/14/2017 6.4  1.8 - 7.7 K/uL Final    ABSOLUTE LYMPHOCYTE COUNT 02/14/2017 1.8  1.0 - 4.8 K/uL Final    ABS. MONOCYTES 02/14/2017 0.7  0.1 - 0.9 K/uL Final    ABS. EOSINOPHILS 02/14/2017 0.2  0.0 - 0.5 K/uL Final    ABS. BASOPHILS 02/14/2017 0.0  0.0 - 0.2 K/uL Final    Glucose 02/14/2017 89  65 - 99 mg/dL Final    BUN 02/14/2017 10  6 - 22 mg/dL Final    Creatinine 02/14/2017 0.7  0.5 - 1.2 mg/dL Final    Sodium 02/14/2017 143  133 - 145 mmol/L Final    Potassium 02/14/2017 5.0  3.5 - 5.5 mmol/L Final    Chloride 02/14/2017 101  98 - 110 mmol/L Final    CO2 02/14/2017 25  20 - 32 mmol/L Final    AST (SGOT) 02/14/2017 18  10 - 37 U/L Final    ALT (SGPT) 02/14/2017 19  5 - 40 U/L Final    Alk. phosphatase 02/14/2017 82  25 - 115 U/L Final    Bilirubin, total 02/14/2017 0.4  0.2 - 1.2 mg/dL Final    Calcium 02/14/2017 9.9  8.4 - 10.5 mg/dL Final    Protein, total 02/14/2017 7.3  6.4 - 8.3 g/dL Final    Albumin 02/14/2017 4.7  3.5 - 5.0 g/dL Final    A-G Ratio 02/14/2017 1.8  1.1 - 2.6 ratio Final    Globulin 02/14/2017 2.6  2.0 - 4.0 g/dL Final    Anion gap 02/14/2017 17.0  mmol/L Final    Comment: Test includes Albumin, Alkaline Phosphatase, ALT, AST, BUN, Calcium, CO2,  Chloride, Creatinine, Glucose, Potassium, Sodium, Total Bilirubin and Total  Protein. Estimated GFR results are reported in mL/min/1.73 sq.m. by the MDRD equation. This eGFR is validated for stable chronic renal failure patients. This   equation  is unreliable in acute illness or patients with normal renal function.      Cynthia Michael 02/14/2017 >60.0  >60.0 Final    GFRNA 02/14/2017 >60.0  >60.0 Final    Mononucleosis screen 02/14/2017 Negative  Negative Final    Comment: NOTE  The degree of purity of the antigen used in this test kit is such that it only  reacts with infectious mononucleosis heterophile antibodies. For this reason,  \"differential\" absorptions are not necessary. Heterophile testing is not  recommended for children under 3years of age. Specific [place] virus  antibody testing is recommended.  T4, Free 02/14/2017 1.3  0.9 - 1.8 ng/dL Final    TSH 02/14/2017 1.13  0.27 - 4.20 mcU/mL Final   Office Visit on 01/25/2017   Component Date Value Ref Range Status    VALID INTERNAL CONTROL POC 01/25/2017 Yes   Final    Group A Strep Ag 01/25/2017 Negative  Negative Final       Physical:   VS:    Visit Vitals    /65 (BP 1 Location: Right arm, BP Patient Position: Sitting)    Pulse 79    Temp 97.7 °F (36.5 °C) (Oral)    Resp 20    Ht 5' 8\" (1.727 m)    Wt 134 lb 6.4 oz (61 kg)    SpO2 100%    BMI 20.44 kg/m2     No exam data present    General:   Pleasant age apropriate female, well-nourished, well-groomed,  conversant, alert, in no acute distress.      Head:  Normocephalic, atraumatic  Ears:  External ears WNL, no pain with movement of pinna, no TTP of mastoid processes    External auditory canals are clear    TMs WNL bilaterally with no bulging, or erythema, no medial effusions present  Eyes:  EOMI, PERRL, no pain with eye movements    + corrective lenses or glasses    No conjunctival injection, abnormal tearing, discharge, chemosis  Mouth:  MMM, o/p WNL without membranes, exudates, ulcerations or petechiae  Nose:  External nares WNL  Neck:  Neck supple with normal ROM for age, no thyromegaly or nodules    no LAD  Cardiovasc:   Regular rate and rhythm, no murmurs, no rubs, no gallops  Pulmonary:   Clear breath sounds bilaterally, good air movement    No wheezing, no rales, no rhonchi, normal respiratory effort  Abdomen:   Abdomen soft, nontender, nondistended, NABS, no masses  Extremities:   No edema, no tenderness with palpation of calves, warm and well-perfused distally    No synovitis or pain with ROM of knees, ankles, hips, wrists, elbows, shoulders. Neuro:   Alert, conversant, appropriate, following commands, no focal deficits. Gait/balance normal     HODAN normal     Sensation normal to light touch distally  Skin:    No rashes noted. Psych:  Affect, mood and judgment appropriate, facies congruent with affect,    Thought process demonstrated is linear and logical    Additional History     Past Medical History:   Diagnosis Date    Acne vulgaris     Currently on 2nd course of Acutane, sees Dr. Gaby To Asthma     Exercise-induced asthma, may have been from chronic bronchitis; doing well on Advair     Family history of thyroid disease in sister - Hashimoto's     IgA deficiency ([facility] [address]     Initially thought to be secondary to Carbatrol; followed by pediatric allergy/immunology through [facility], has not resolved with med change; every 5 year PPSV23    Migraine without aura and without status migrainosus, not intractable 2/14/2017    Seizures ([facility] [address] 9years old    Absence seizures originally, developed tonic-clonic seizures in 2007; Followed by Dr. Panfilo Del Rosario      Past Surgical History:   Procedure Laterality Date    HX WISDOM TEETH EXTRACTION  2009     Social History   Substance Use Topics    Smoking status: Never Smoker    Smokeless tobacco: Never Used    Alcohol use Yes      Comment: 1/wk     Current Outpatient Prescriptions   Medication Sig    azithromycin (ZITHROMAX) 250 mg tablet     ondansetron (ZOFRAN ODT) 8 mg disintegrating tablet TAKE 1 TABLET BY MOUTH EVERY 12 HOURS AS NEEDED FOR NAUSEA    fluticasone-vilanterol (BREO ELLIPTA) 200-25 mcg/dose inhaler Take 1 Puff by inhalation daily.     albuterol sulfate (PROAIR RESPICLICK) 90 mcg/actuation aepb Take 90 mcg by mouth four (4) times daily.  SUMAtriptan (IMITREX) 20 mg/actuation nasal spray 1 Spray as needed for Migraine.  cholecalciferol (VITAMIN D3) 5,000 unit capsule Take 1 Cap by mouth daily.  zonisamide (ZONEGRAN) 100 mg capsule Take 200 mg by mouth two (2) times a day.  CETIRIZINE HCL (ALLER-MARSHALL PO) Take  by mouth.  ranitidine (ZANTAC) 75 mg tablet Take 75 mg by mouth two (2) times a day.  calcium-cholecalciferol, d3, (CALCIUM 600 + D) 600-125 mg-unit tab Take  by mouth.  folic acid (FOLVITE) 1 mg tablet Take  by mouth daily.  Lamotrigine (LAMICTAL XR) 200 mg tr24 Take 300 mg by mouth two (2) times a day. No current facility-administered medications for this visit. Allergies   Allergen Reactions    Amoxicillin Hives     Keflex tolerated    Levaquin [Levofloxacin] Seizures     Seizure - lowered seizure threshold    Sulfa (Sulfonamide Antibiotics) Other (comments)     Vision and balance problem while on Vimpat, but had previously tolerated Bactrim well       Encounter Diagnoses:     Encounter Diagnoses     ICD-10-CM ICD-9-CM   1. Routine adult health maintenance Z00.00 V70.0   2. Seizures ([facility] [address] R56.9 780.39   3. Screening for endocrine/metabolic/immunity disorders Z13.29 V77.99    Z13.228     Z13.0    4. Screening for diabetes mellitus Z13.1 V77.1   5. Screening, lipid Z13.220 V77.91   6. Screening for thyroid disorder Z13.29 V77.0   7. Screening for blood or protein in urine Z13.89 V82.9   8. Encounter for vitamin deficiency screening Z13.21 V77.99            This document may have been created with the aid of dictation software. Text may contain errors, particularly phonetic errors.

## 2017-12-21 NOTE — PATIENT INSTRUCTIONS
No visits with results within 2 Week(s) from this visit. Latest known visit with results is:    Orders Only on 03/02/2017   Component Date Value Ref Range Status    Lamotrigine 03/02/2017 5.8  2.0 - 20.0 ug/mL Final    Comment:                                 Detection Limit = 1.0  Performed at:  [address]  [id number]  : Gary Rodarte MD, Phone:  [phone]         AFTER VISIT INSTRUCTIONS     Fasting labs at [facility] at your next earliest convenience. Follow up with Drew Farley MD yearly for well visit (30m). Please arrive 15 minutes prior to your scheduled appointment time. Call and request an earlier appointment if you have any new questions or concerns. LAB HOURS AT [facility]     Monday-Friday 7a-3p  Closed on holidays    TESTING RESULTS     You will be contacted if your lab results indicate a change in therapy or further evaluation. Results of tests performed in this office will be viewable through [facility]. If you need assistance with accessing your [employer] account, you can call the [address] [place] [address] at #[phone]. STAY UP TO DATE WITH YOUR PREVENTIVE HEALTH   Please review the following recommendations and if you are not sure that your healthcare is up to date, ask your provider at your next scheduled office visit. -- Yearly preventive visits (sometimes called \"physicals\") are recommended for all adults. Medicare and Medicaid do not pay for physicals. Medicare covers a yearly wellness visit that differs in many ways from a traditional physical, but is an opportunity to make sure you are maximizing the preventive services that will be covered by Medicare. Each insurance carrier will have different regulations about what services may be covered, so be sure to talk with your insurance provider before scheduling a visit.      -- Colon cancer screening is recommended for all patients 48 and older with either colonoscopy (interval will vary depending on results) or yearly stool testing.      -- Mammogram is recommended every 2 years for women ages 36 to 76. -- Pap smear is recommended every 3-5 years for women aged 24 to 72, unless your cervix has been surgically removed for benign reasons. -- Flu shot is recommended every fall for adults of all ages. -- Prevnar 15 is recommended at the age of 72 for all adults. -- Pneumovax is recommended one year after Prevnar 13 for all adults, but earlier if you have a history of chronic health problems (including diabetes and asthma) or smoking. -- Tetanus boosters are recommended every 10 years for adults of all ages. Medicare and Medicaid do not cover tetanus as a preventive booster, but will pay for patients to receive a booster in the event of certain injuries. MEDICATION REFILLS      -- Controlled substance refills must be obtained during a scheduled office visit with the provider. -- All other medication refills are to be requested by calling your pharmacy during regular office hours. Allow at least 2 business days for processing. Refills will not be provided by the after hours answering service/on call provider. HOLIDAY CLOSURES:     The office is closed on six major holidays each year:  New Year's Day, July 4th, Memorial Day, Labor Day, Thanksgiving, and Pao Day. Lab and X-ray services are not available on those days.

## 2017-12-21 NOTE — MR AVS SNAPSHOT
Visit Information Date & Time Provider Department Dept. Phone Encounter #  
 12/21/2017  3:00 PM Zoë Vern, 3 [facility] [phone] [id number] Upcoming Health Maintenance Date Due  
 PAP AKA CERVICAL CYTOLOGY 4/19/2012 HPV AGE 9Y-26Y (2 of 3 - Female 3 Dose Series) 3/23/2016 Pneumococcal 19-64 Highest Risk (3 of 3 - PCV13) 8/22/2017 DTaP/Tdap/Td series (3 - Td) 10/21/2021 Allergies as of 12/21/2017  Review Complete On: 12/21/2017 By: Zoë Porras MD  
  
 Severity Noted Reaction Type Reactions Amoxicillin High 04/01/2014   Intolerance Hives Keflex tolerated Levaquin [Levofloxacin] High 01/27/2016   Side Effect Seizures Seizure - lowered seizure threshold Sulfa (Sulfonamide Antibiotics)  10/20/2015    Other (comments) Vision and balance problem while on Vimpat, but had previously tolerated Bactrim well Current Immunizations  Reviewed on 12/9/2016 Name Date Hep A Vaccine (Adult) 5/24/2012, 10/21/2011 Hep B, Adol/Ped 4/20/1993, 10/15/1992 IPV 10/21/2011, 4/20/1995 Influenza Vaccine 10/20/2015 10:59 AM  
 Influenza Vaccine (Quad) PF 10/5/2017  3:31 PM  
 Influenza Vaccine PF 10/29/2014  4:04 PM  
 Japanese Encephalitis Vaccine (SQ) 11/23/2011, 10/21/2011 MMR 4/20/1995, 7/20/1992 Meningococcal (MPSV4) Vaccine 11/15/2008 Pneumococcal Polysaccharide (PPSV-23) 8/22/2016, 7/1/2011 Td 10/7/2005 Tdap 10/21/2011 Typhoid Vi Capsular Polysaccharide Vaccine 10/21/2011 Not reviewed this visit You Were Diagnosed With   
  
 Codes Comments Routine adult health maintenance    -  Primary ICD-10-CM: Z00.00 ICD-9-CM: V70.0 Seizures ([address]     ICD-10-CM: R56.9 ICD-9-CM: 780.39 Screening for endocrine/metabolic/immunity disorders     ICD-10-CM: Z13.29, Z13.228, Z13.0 ICD-9-CM: V77.99 Screening for diabetes mellitus     ICD-10-CM: Z13.1 ICD-9-CM: V77.1 Screening, lipid     ICD-10-CM: C18.757 ICD-9-CM: V77.91 Screening for thyroid disorder     ICD-10-CM: Z13.29 ICD-9-CM: V77.0 Screening for blood or protein in urine     ICD-10-CM: Z13.89 ICD-9-CM: V82.9 Encounter for vitamin deficiency screening     ICD-10-CM: Z13.21 ICD-9-CM: V77.99 Vitals BP Pulse Temp Resp Height(growth percentile) Weight(growth percentile) 114/65 (BP 1 Location: Right arm, BP Patient Position: Sitting) 79 97.7 °F (36.5 °C) (Oral) 20 5' 8\" (1.727 m) 134 lb 6.4 oz (61 kg) SpO2 BMI OB Status Smoking Status 100% 20.44 kg/m2 Chemically Induced Never Smoker Vitals History BMI and BSA Data Body Mass Index Body Surface Area  
 20.44 kg/m 2 1.71 m 2 Preferred Pharmacy Pharmacy Name Phone [facility] [phone]  [facility] [phone] Your Updated Medication List  
  
   
This list is accurate as of: 12/21/17  3:39 PM.  Always use your most recent med list.  
  
  
  
  
 ALLER-MARSHALL PO Take  by mouth. azithromycin 250 mg tablet Commonly known as:  Benedict Kathy BREO ELLIPTA 200-25 mcg/dose inhaler Generic drug:  fluticasone-vilanterol Take 1 Puff by inhalation daily. CALCIUM 600 + D 600-125 mg-unit Tab Generic drug:  calcium-cholecalciferol (d3) Take  by mouth. cholecalciferol 5,000 unit capsule Commonly known as:  VITAMIN D3 Take 1 Cap by mouth daily. folic acid 1 mg tablet Commonly known as:  Google Take  by mouth daily. LaMICtal  mg Tr24 ER tablet Generic drug:  lamoTRIgine Take 300 mg by mouth two (2) times a day. ondansetron 8 mg disintegrating tablet Commonly known as:  ZOFRAN ODT  
TAKE 1 TABLET BY MOUTH EVERY 12 HOURS AS NEEDED FOR NAUSEA PROAIR RESPICLICK 90 mcg/actuation Aepb Generic drug:  albuterol sulfate Take 90 mcg by mouth four (4) times daily. raNITIdine 75 mg tablet Commonly known as:  ZANTAC Take 75 mg by mouth two (2) times a day. SUMAtriptan 20 mg/actuation nasal spray Commonly known as:  IMITREX  
1 Spray as needed for Migraine. zonisamide 100 mg capsule Commonly known as:  Tessa Person Take 200 mg by mouth two (2) times a day. To-Do List   
 12/22/2017 Lab:  CBC WITH AUTOMATED DIFF   
  
 12/22/2017 Lab:  HEMOGLOBIN A1C WITH EAG   
  
 12/22/2017 Lab:  LIPID PANEL   
  
 12/22/2017 Lab:  METABOLIC PANEL, COMPREHENSIVE   
  
 12/22/2017 Lab:  TSH AND FREE T4   
  
 12/22/2017 Lab:  URINALYSIS W/ RFLX MICROSCOPIC   
  
 12/22/2017 Lab:  VITAMIN D, 25 HYDROXY Patient Instructions No visits with results within 2 Week(s) from this visit. Latest known visit with results is: 
 
Orders Only on 03/02/2017 Component Date Value Ref Range Status  Lamotrigine 03/02/2017 5.8  2.0 - 20.0 ug/mL Final  
 Comment:                                 Detection Limit = 1.0 Performed at:  [address]  [id number] : Mi Dobbs MD, Phone:  [phone] AFTER VISIT INSTRUCTIONS Fasting labs at [facility] at your next earliest convenience. Follow up with Andrea Cummings MD yearly for well visit (30m). Please arrive 15 minutes prior to your scheduled appointment time. Call and request an earlier appointment if you have any new questions or concerns. LAB HOURS AT [facility] Monday-Friday 7a-3p Closed on holidays TESTING RESULTS You will be contacted if your lab results indicate a change in therapy or further evaluation. Results of tests performed in this office will be viewable through [facility]. If you need assistance with accessing your [employer] account, you can call the [address] [place] [address] at #[phone].    
 
 
STAY UP  [address]  
 Please review the following recommendations and if you are not sure that your healthcare is up to date, ask your provider at your next scheduled office visit. -- Yearly preventive visits (sometimes called \"physicals\") are recommended for all adults. Medicare and Medicaid do not pay for physicals. Medicare covers a yearly wellness visit that differs in many ways from a traditional physical, but is an opportunity to make sure you are maximizing the preventive services that will be covered by Medicare. Each insurance carrier will have different regulations about what services may be covered, so be sure to talk with your insurance provider before scheduling a visit. -- Colon cancer screening is recommended for all patients 48 and older with either colonoscopy (interval will vary depending on results) or yearly stool testing.   
 
-- Mammogram is recommended every 2 years for women ages 36 to 76. -- Pap smear is recommended every 3-5 years for women aged 24 to 72, unless your cervix has been surgically removed for benign reasons. -- Flu shot is recommended every fall for adults of all ages. -- Prevnar 15 is recommended at the age of 72 for all adults. -- Pneumovax is recommended one year after Prevnar 13 for all adults, but earlier if you have a history of chronic health problems (including diabetes and asthma) or smoking. -- Tetanus boosters are recommended every 10 years for adults of all ages. Medicare and Medicaid do not cover tetanus as a preventive booster, but will pay for patients to receive a booster in the event of certain injuries. MEDICATION REFILLS   
 
-- Controlled substance refills must be obtained during a scheduled office visit with the provider. -- All other medication refills are to be requested by calling your pharmacy during regular office hours. Allow at least 2 business days for processing.   Refills will not be provided by the after hours answering service/on call provider. HOLIDAY CLOSURES:  
 
The office is closed on six major holidays each year:  New Year's Day, July 4th, Memorial Day, Labor Day, Thanksgiving, and [place] Day. Lab and X-ray services are not available on those days. Introducing [facility] HEALTH [facility]! Dear Elie Ramírez: Thank you for requesting a [employer] account. Our records indicate that you already have an active [employer] account. You can access your account anytime at [email or web address] Did you know that you can access your hospital and ER discharge instructions at any time in [employer]? You can also review all of your test results from your hospital stay or ER visit. Additional Information If you have questions, please visit the Frequently Asked Questions section of the [employer] website at [email or web address] Remember, [employer] is NOT to be used for urgent needs. For medical emergencies, dial 911. Now available from your iPhone and Android! Please provide this summary of care documentation to your next provider. Your primary care clinician is listed as Antoni Salmon. If you have any questions after today's visit, please call [phone].

## 2017-12-22 DIAGNOSIS — Z13.1 SCREENING FOR DIABETES MELLITUS: ICD-10-CM

## 2017-12-22 DIAGNOSIS — Z13.220 SCREENING, LIPID: ICD-10-CM

## 2017-12-22 DIAGNOSIS — Z13.29 SCREENING FOR ENDOCRINE/METABOLIC/IMMUNITY DISORDERS: ICD-10-CM

## 2017-12-22 DIAGNOSIS — Z13.21 ENCOUNTER FOR VITAMIN DEFICIENCY SCREENING: ICD-10-CM

## 2017-12-22 DIAGNOSIS — Z13.228 SCREENING FOR ENDOCRINE/METABOLIC/IMMUNITY DISORDERS: ICD-10-CM

## 2017-12-22 DIAGNOSIS — Z13.0 SCREENING FOR ENDOCRINE/METABOLIC/IMMUNITY DISORDERS: ICD-10-CM

## 2017-12-22 DIAGNOSIS — Z13.29 SCREENING FOR THYROID DISORDER: ICD-10-CM

## 2017-12-22 DIAGNOSIS — Z13.89 SCREENING FOR BLOOD OR PROTEIN IN URINE: ICD-10-CM

## 2018-01-15 ENCOUNTER — TELEPHONE (OUTPATIENT)
Dept: FAMILY MEDICINE CLINIC | Age: 28
End: 2018-01-15

## 2018-01-15 NOTE — TELEPHONE ENCOUNTER
Labs received via fax from [facility]:    Please send letter, labs look okay - no change to treatment.

## 2018-02-16 DIAGNOSIS — J32.9 RECURRENT SINUSITIS: Primary | ICD-10-CM

## 2018-02-16 RX ORDER — AZITHROMYCIN 250 MG/1
TABLET, FILM COATED ORAL
Qty: 11 TAB | Refills: 0 | Status: SHIPPED | OUTPATIENT
Start: 2018-02-16 | End: 2018-03-29 | Stop reason: ALTCHOICE

## 2018-03-15 ENCOUNTER — TELEPHONE (OUTPATIENT)
Dept: FAMILY MEDICINE CLINIC | Age: 28
End: 2018-03-15

## 2018-03-15 DIAGNOSIS — R56.9 SEIZURES (HCC): Primary | Chronic | ICD-10-CM

## 2018-03-15 NOTE — TELEPHONE ENCOUNTER
Please call patient - I'm not sure my [employer] messages are making it through to patients for some reason - I'm happy to have her Magnesium level checked, just find out where she would like this done and I can set the order up. Original [employer] message below:  Technology fails!  This is why relying on computers is risky!      I'm just glad you got better - I really don't like the idea of delays in treatment with your immunoglobulin issue and a recent implant.       I saw the recent ER visit on 3/9/18 and the OV note from Dr. Za Becerril from this week.       Weirdly, Magnesium isn't a routinely checked lab, so I'm not sure what your level is, but I can place an order to have it checked (no need to fast).  There have been theories about reduced magnesium content in normally magnesium rich foods due to over-farming/depleted soils - I also tend to be skeptical, but curious. Alma Conway let me know where you would like to have it drawn (here, [facility], or [facility]/some other facility) so I can place the order correctly.  If it's done outside of my office I'll print an order for you to  (or it can be faxed to you).       Dr. Calin Perez

## 2018-03-22 DIAGNOSIS — R56.9 SEIZURES (HCC): Chronic | ICD-10-CM

## 2018-03-29 ENCOUNTER — OFFICE VISIT (OUTPATIENT)
Dept: FAMILY MEDICINE CLINIC | Age: 28
End: 2018-03-29

## 2018-03-29 VITALS
WEIGHT: 133 LBS | RESPIRATION RATE: 18 BRPM | SYSTOLIC BLOOD PRESSURE: 105 MMHG | DIASTOLIC BLOOD PRESSURE: 68 MMHG | OXYGEN SATURATION: 100 % | TEMPERATURE: 97.6 F | BODY MASS INDEX: 20.16 KG/M2 | HEIGHT: 68 IN | HEART RATE: 90 BPM

## 2018-03-29 DIAGNOSIS — J32.9 RECURRENT SINUSITIS: ICD-10-CM

## 2018-03-29 DIAGNOSIS — J02.0 STREP PHARYNGITIS: Primary | ICD-10-CM

## 2018-03-29 LAB
FLUAV+FLUBV AG NOSE QL IA.RAPID: NEGATIVE POS/NEG
FLUAV+FLUBV AG NOSE QL IA.RAPID: NEGATIVE POS/NEG
S PYO AG THROAT QL: POSITIVE
VALID INTERNAL CONTROL?: YES
VALID INTERNAL CONTROL?: YES

## 2018-03-29 RX ORDER — AZITHROMYCIN 250 MG/1
TABLET, FILM COATED ORAL
Qty: 11 TAB | Refills: 0 | Status: SHIPPED | OUTPATIENT
Start: 2018-03-29 | End: 2018-09-21 | Stop reason: ALTCHOICE

## 2018-03-29 NOTE — PROGRESS NOTES
Maggie Castañeda is a 33 y.o. female (: 1990) presenting to address:sore throat x1 day    Chief Complaint   Patient presents with    Sore Throat     x1 days       Vitals:    18 1312   BP: 105/68   Pulse: 90   Resp: 18   Temp: 97.6 °F (36.4 °C)   TempSrc: Oral   SpO2: 100%   Weight: 133 lb (60.3 kg)   Height: 5' 8\" (1.727 m)   PainSc:   4   PainLoc: Generalized       Hearing/Vision:   No exam data present    Learning Assessment:     Learning Assessment 2015   PRIMARY LEARNER Patient   HIGHEST LEVEL OF EDUCATION - PRIMARY LEARNER  > 4 YEARS OF COLLEGE   BARRIERS PRIMARY LEARNER NONE   PRIMARY LANGUAGE ENGLISH   LEARNER PREFERENCE PRIMARY OTHER (COMMENT)   ANSWERED BY patient   RELATIONSHIP SELF     Depression Screening:     PHQ over the last two weeks 2017   Little interest or pleasure in doing things Not at all   Feeling down, depressed or hopeless Not at all   Total Score PHQ 2 0     Fall Risk Assessment:   No flowsheet data found. Abuse Screening:     Abuse Screening Questionnaire 2016   Do you ever feel afraid of your partner? N   Are you in a relationship with someone who physically or mentally threatens you? N   Is it safe for you to go home? Y     Coordination of Care Questionaire:   1. Have you been to the ER, urgent care clinic since your last visit? Hospitalized since your last visit? SPAH concussion     2. Have you seen or consulted any other health care providers outside of the [address] [place] [address] since your last visit? Include any pap smears or colon screening. no    Advanced Directive:   1. Do you have an Advanced Directive? no    2. Would you like information on Advanced Directives? No    Patient has already received flu vaccine.

## 2018-03-29 NOTE — PATIENT INSTRUCTIONS
Strep Throat: Care Instructions  Your Care Instructions    Strep throat is a bacterial infection that causes sudden, severe sore throat and fever. Strep throat, which is caused by bacteria called streptococcus, is treated with antibiotics. Sometimes a strep test is necessary to tell if the sore throat is caused by strep bacteria. Treatment can help ease symptoms and may prevent future problems. Follow-up care is a key part of your treatment and safety. Be sure to make and go to all appointments, and call your doctor if you are having problems. It's also a good idea to know your test results and keep a list of the medicines you take. How can you care for yourself at home? · Take your antibiotics as directed. Do not stop taking them just because you feel better. You need to take the full course of antibiotics. · Strep throat can spread to others until 24 hours after you begin taking antibiotics. During this time, you should avoid contact with other people at work or home, especially infants and children. Do not sneeze or cough on others, and wash your hands often. Keep your drinking glass and eating utensils separate from those of others, and wash these items well in hot, soapy water. · Gargle with warm salt water at least once each hour to help reduce swelling and make your throat feel better. Use 1 teaspoon of salt mixed in 8 fluid ounces of warm water. · Take an over-the-counter pain medication, such as acetaminophen (Tylenol), ibuprofen (Advil, Motrin), or naproxen (Aleve). Read and follow all instructions on the label. · Try an over-the-counter anesthetic throat spray or throat lozenges, which may help relieve throat pain. · Drink plenty of fluids. Fluids may help soothe an irritated throat. Hot fluids, such as tea or soup, may help your throat feel better. · Eat soft solids and drink plenty of clear liquids.  Flavored ice pops, ice cream, scrambled eggs, sherbet, and gelatin dessert (such as Jell-O) may also soothe the throat. · Get lots of rest.  · Do not smoke, and avoid secondhand smoke. If you need help quitting, talk to your doctor about stop-smoking programs and medicines. These can increase your chances of quitting for good. · Use a vaporizer or humidifier to add moisture to the air in your bedroom. Follow the directions for cleaning the machine. When should you call for help? Call your doctor now or seek immediate medical care if:  ? · You have a new or higher fever. ? · You have a fever with a stiff neck or severe headache. ? · You have new or worse trouble swallowing. ? · Your sore throat gets much worse on one side. ? · Your pain becomes much worse on one side of your throat. ? Watch closely for changes in your health, and be sure to contact your doctor if:  ? · You are not getting better after 2 days (48 hours). ? · You do not get better as expected. Where can you learn more? Go to http://dayna-emery.info/. Enter K625 in the search box to learn more about \"Strep Throat: Care Instructions. \"  Current as of: May 12, 2017  Content Version: 11.4  © 4379-6009 Healthwise, Incorporated. Care instructions adapted under license by [employer] (which disclaims liability or warranty for this information). If you have questions about a medical condition or this instruction, always ask your healthcare professional. [address] any warranty or liability for your use of this information.

## 2018-03-29 NOTE — PROGRESS NOTES
Subjective:   Armand Nova is a 33 y.o. female who complains of sore throat, swollen glands, myalgias, headache, nausea, fever and chills for 2 days. She denies a history of shortness of breath and wheezing. Patient does not smoke cigarettes. Relevant PMH: seizures. Objective:      Visit Vitals    /68 (BP 1 Location: Right arm, BP Patient Position: Sitting)    Pulse 90    Temp 97.6 °F (36.4 °C) (Oral)    Resp 18    Ht 5' 8\" (1.727 m)    Wt 133 lb (60.3 kg)    SpO2 100%    BMI 20.22 kg/m2      Appears alert, well appearing, and in no distress. Ears: right TM red, dull, bulging, left TM red, dull, bulging  Oropharynx: erythematous  Neck: bilateral symmetric anterior adenopathy  Lungs: clear to auscultation, no wheezes, rales or rhonchi, symmetric air entry  The abdomen is soft without tenderness or hepatosplenomegaly. Rapid Strep test is positive  Flu - negative     Assessment/Plan:   strep pharyngitis  Per orders. Gargle, use acetaminophen or other OTC analgesic, and take Rx fully as prescribed. Call if other family members develop similar symptoms. See prn. Encounter Diagnoses   Name Primary?  Strep pharyngitis Yes    Recurrent sinusitis      Orders Placed This Encounter    AMB POC RAPID STREP A    AMB POC CAITLIN INFLUENZA A/B TEST    azithromycin (ZITHROMAX) 250 mg tablet   .

## 2018-04-12 NOTE — PROGRESS NOTES
PRE-VISIT PLANNING:     Future Appointments  Date Time Provider Gera Griffin   2018 9:30 AM Stephen Dennison MD [facility] (PCP is Stephen Dennison MD) is scheduled for an office visit with Stephen Dennison MD on 2018 for evaluation of rash. Encounter opened prior to visit to complete pre-visit planning, update and review pertinent sections in the chart. Last office visit with PCP was 2018. Rooming Nurse Items:  -- Release for last Pap smear report    Pending items for provider to review with patient:    Health Maintenance Due   Topic Date Due    PAP AKA CERVICAL CYTOLOGY  2012    HPV Age 9Y-34Y (2 of 3 - Female 3 Dose Series) 2016    Pneumococcal 19-64 Highest Risk (3 of 3 - PCV13) 2017          Internal Medicine  Acute Care Visit  3 [facility]  [address], [address] [place], [facility], [address]  Phone [phone]; Fax [phone]    Date of Service:  2018  Patient's Name & :   Stephen Dennison - 1990   Patient's PCP:  Stephen Dennison MD       Assessment/Plan:       Stephen Dennison was seen today for an acute care visit. The primary encounter diagnosis was Dermatitis. A diagnosis of Sore throat was also pertinent to this visit. 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 Discussed achieving/maintaining healthy weight and BMI. Follow up BMI/weight at next visit.      Orders Placed This Encounter    REFERRAL TO DERMATOLOGY    AMB POC RAPID STREP A       Patient Instructions     Dermatology appointment today at 11:30am with [facility] [address] [place] [address] [address]  [phone]    [facility] FOR EVERYONE! Your doctor recommends a lifestyle that incorporates daily exercise and a diet focused on whole, unprocessed foods. Aim to follow a diet of 2/3 non-starchy vegetables and low glycemic impact fruits and 1/3 lean proteins. Avoid processed/refined carbohydrates (especially bread products, bakery items, sugary drinks, cereals, crackers and sweets). Minimum 30 minutes of cardio and weight training/resistance exercise daily to build muscle, reduce insulin sensitivity and increase resting fat & calorie burning capacity. TESTING RESULTS   Normal results will be released to [employer] or sent by [address] mail. [facility] #[phone]. Results of tests performed at outside facilities will not appear in [address] If you have questions about your results, please feel free to schedule a follow up appointment to discuss your concerns with your PCP. MEDICATION REFILLS      -- Medication refills should be requested at least 2 business days in advance through your pharmacy. Refills will not be provided by the after hours/on call provider. The patient states understanding of recommended treatment plan. Kameron Rodriguez MD - Internal Medicine  4/13/2018, 7:55 PM    Subjective/Discussion        Chief Complaint   Patient presents with    Rash     x 1 week       Yaritzaadriana Gironehan is a 33 y.o. female presenting today with rash that has been worsening for nearly a week. Tested positive in the office for Strep throat 3/29/18. Put on course of azithromycin which she has tolerated well in the past.  Around day 4 of azithromycin began to have red bumps on face, neck and chest.   At first thought she was breaking out with acne, but lesions became itchy and then spread down trunk and extremities and up onto scalp. Very itchy, uncomfortable. Worst area is on chest.   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  Now is off Vimpat now, used to tolerate sulfa meds. No change in skin products/toiletries/detergents/household chemical exposures. No new meds. Has not traveled to/slept in/visited any new environment. Review of Systems   Constitutional: Positive for malaise/fatigue. Negative for chills, diaphoresis, fever and weight loss. HENT: Positive for sore throat. Negative for congestion, ear discharge, ear pain, hearing loss, nosebleeds, sinus pain and tinnitus. Respiratory: Negative. Negative for stridor. Skin: Positive for itching and rash. Neurological: Negative for weakness. Objective:     /71 (BP 1 Location: Right arm, BP Patient Position: Sitting)  Pulse 78  Temp 98.6 °F (37 °C) (Oral)   Resp 20  Ht 5' 8\" (1.727 m)  Wt 136 lb (61.7 kg)  LMP 04/05/2018  SpO2 99%  BMI 20.68 kg/m2    Physical Exam   Constitutional: She appears well-developed and well-nourished. No distress. HENT:   Mouth/Throat: Oropharynx is clear and moist.   Eyes: Conjunctivae and EOM are normal. Pupils are equal, round, and reactive to light. Right eye exhibits no discharge. Left eye exhibits no discharge. Neck: Normal range of motion. Neck supple. Cardiovascular: Normal rate, regular rhythm, normal heart sounds and intact distal pulses. Pulmonary/Chest: Effort normal and breath sounds normal. No stridor. Lymphadenopathy:     She has no cervical adenopathy. Skin: She is not diaphoretic. Small round well demarcated erythematous slightly raised lesions, some with mild overlying scale, scattered on face, neck, trunk, extremities, sparing palms/soles.   Most concentrated on chest.                Current Outpatient Prescriptions   Medication Sig    ondansetron (ZOFRAN ODT) 8 mg disintegrating tablet TAKE 1 TABLET BY MOUTH EVERY 12 HOURS AS NEEDED FOR NAUSEA    fluticasone-vilanterol (BREO ELLIPTA) 200-25 mcg/dose inhaler Take 1 Puff by inhalation daily.  albuterol sulfate (PROAIR RESPICLICK) 90 mcg/actuation aepb Take 90 mcg by mouth four (4) times daily.  SUMAtriptan (IMITREX) 20 mg/actuation nasal spray 1 Spray as needed for Migraine.  cholecalciferol (VITAMIN D3) 5,000 unit capsule Take 1 Cap by mouth daily.  zonisamide (ZONEGRAN) 100 mg capsule Take 200 mg by mouth two (2) times a day.  CETIRIZINE HCL (ALLER-MARSHALL PO) Take  by mouth.  ranitidine (ZANTAC) 75 mg tablet Take 75 mg by mouth two (2) times a day.  calcium-cholecalciferol, d3, (CALCIUM 600 + D) 600-125 mg-unit tab Take  by mouth.  folic acid (FOLVITE) 1 mg tablet Take  by mouth daily.  Lamotrigine (LAMICTAL XR) 200 mg tr24 Take 300 mg by mouth two (2) times a day.  azithromycin (ZITHROMAX) 250 mg tablet Take 2 tabs on day 1 then take 1 tab daily for remainder of 10 day course. No current facility-administered medications for this visit. Allergies   Allergen Reactions    Amoxicillin Hives     Keflex tolerated    Levaquin [Levofloxacin] Seizures     Seizure - lowered seizure threshold    Sulfa (Sulfonamide Antibiotics) Other (comments)     Vision and balance problem while on Vimpat, but had previously tolerated Bactrim well       Encounter Diagnoses:     Encounter Diagnoses     ICD-10-CM ICD-9-CM   1. Dermatitis L30.9 692.9   2.  Sore throat J02.9 462

## 2018-04-13 ENCOUNTER — OFFICE VISIT (OUTPATIENT)
Dept: FAMILY MEDICINE CLINIC | Age: 28
End: 2018-04-13

## 2018-04-13 VITALS
BODY MASS INDEX: 20.61 KG/M2 | TEMPERATURE: 98.6 F | HEART RATE: 78 BPM | WEIGHT: 136 LBS | DIASTOLIC BLOOD PRESSURE: 71 MMHG | RESPIRATION RATE: 20 BRPM | HEIGHT: 68 IN | OXYGEN SATURATION: 99 % | SYSTOLIC BLOOD PRESSURE: 102 MMHG

## 2018-04-13 DIAGNOSIS — J02.9 SORE THROAT: ICD-10-CM

## 2018-04-13 DIAGNOSIS — L30.9 DERMATITIS: Primary | ICD-10-CM

## 2018-04-13 LAB
S PYO AG THROAT QL: NEGATIVE
VALID INTERNAL CONTROL?: YES

## 2018-04-13 NOTE — MR AVS SNAPSHOT
[address] 2139 [facility] [id number] [phone] Patient: Tj Heart MRN: [id number] KNL:6/47/[id number] Visit Information Date & Time Provider Department Dept. Phone Encounter #  
 4/13/2018  9:30 AM Tj Heart, [facility] [phone] [id number] Upcoming Health Maintenance Date Due  
 PAP AKA CERVICAL CYTOLOGY 4/19/2012 HPV Age 9Y-34Y (2 of 3 - Female 3 Dose Series) 3/23/2016 Pneumococcal 19-64 Highest Risk (3 of 3 - PCV13) 8/22/2017 DTaP/Tdap/Td series (3 - Td) 10/21/2021 Allergies as of 4/13/2018  Review Complete On: 4/13/2018 By: Tj Heart MD  
  
 Severity Noted Reaction Type Reactions Amoxicillin High 04/01/2014   Intolerance Hives Keflex tolerated Levaquin [Levofloxacin] High 01/27/2016   Side Effect Seizures Seizure - lowered seizure threshold Sulfa (Sulfonamide Antibiotics)  10/20/2015    Other (comments) Vision and balance problem while on Vimpat, but had previously tolerated Bactrim well Current Immunizations  Reviewed on 12/9/2016 Name Date Hep A Vaccine (Adult) 5/24/2012, 10/21/2011 Hep B, Adol/Ped 4/20/1993, 10/15/1992 IPV 10/21/2011, 4/20/1995 Influenza Vaccine 10/20/2015 10:59 AM  
 Influenza Vaccine (Quad) PF 10/5/2017  3:31 PM  
 Influenza Vaccine PF 10/29/2014  4:04 PM  
 Japanese Encephalitis Vaccine (SQ) 11/23/2011, 10/21/2011 MMR 4/20/1995, 7/20/1992 Meningococcal (MPSV4) Vaccine 11/15/2008 Pneumococcal Polysaccharide (PPSV-23) 8/22/2016, 7/1/2011 Td 10/7/2005 Tdap 10/21/2011 Typhoid Vi Capsular Polysaccharide Vaccine 10/21/2011 Not reviewed this visit You Were Diagnosed With   
  
 Codes Comments Dermatitis    -  Primary ICD-10-CM: L30.9 ICD-9-CM: 692.9 Vitals BP Pulse Temp Resp Height(growth percentile) Weight(growth percentile) 102/71 (BP 1 Location: Right arm, BP Patient Position: Sitting) 78 98.6 °F (37 °C) (Oral) 20 5' 8\" (1.727 m) 136 lb (61.7 kg) LMP SpO2 BMI OB Status Smoking Status 04/05/2018 99% 20.68 kg/m2 Having regular periods Never Smoker Vitals History BMI and BSA Data Body Mass Index Body Surface Area  
 20.68 kg/m 2 1.72 m 2 Preferred Pharmacy Pharmacy Name Phone [facility] [address] [phone] Your Updated Medication List  
  
   
This list is accurate as of 4/13/18 10:02 AM.  Always use your most recent med list.  
  
  
  
  
 ALLER-MARSHALL PO Take  by mouth. azithromycin 250 mg tablet Commonly known as:  Doloris Ripple Take 2 tabs on day 1 then take 1 tab daily for remainder of 10 day course. BREO ELLIPTA 200-25 mcg/dose inhaler Generic drug:  fluticasone-vilanterol Take 1 Puff by inhalation daily. CALCIUM 600 + D 600-125 mg-unit Tab Generic drug:  calcium-cholecalciferol (d3) Take  by mouth. cholecalciferol 5,000 unit capsule Commonly known as:  VITAMIN D3 Take 1 Cap by mouth daily. folic acid 1 mg tablet Commonly known as:  Google Take  by mouth daily. LaMICtal  mg Tr24 ER tablet Generic drug:  lamoTRIgine Take 300 mg by mouth two (2) times a day. ondansetron 8 mg disintegrating tablet Commonly known as:  ZOFRAN ODT  
TAKE 1 TABLET BY MOUTH EVERY 12 HOURS AS NEEDED FOR NAUSEA PROAIR RESPICLICK 90 mcg/actuation Aepb Generic drug:  albuterol sulfate Take 90 mcg by mouth four (4) times daily. raNITIdine 75 mg tablet Commonly known as:  ZANTAC Take 75 mg by mouth two (2) times a day. SUMAtriptan 20 mg/actuation nasal spray Commonly known as:  IMITREX  
1 Spray as needed for Migraine. zonisamide 100 mg capsule Commonly known as:  Suszasivakumar Pares Take 200 mg by mouth two (2) times a day. We Performed the Following REFERRAL TO DERMATOLOGY [REF19 Custom] Comments:  
 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  
  
 [id number] Jose E Dugan MD   
   [address] [facility] [address] Phone: [phone] Fax: [phone] Visits Status Start Date End Date 1 New Request 4/13/18 4/13/19 If your referral has a status of pending review or denied, additional information will be sent to support the outcome of this decision. Patient Instructions Dermatology appointment today at 11:30am with [facility] [place] [address] 
[phone] A HEALTHY LIFESTYLE IS RECOMMENDED FOR EVERYONE! Your doctor recommends a lifestyle that incorporates daily exercise and a diet focused on whole, unprocessed foods. Aim to follow a diet of 2/3 non-starchy vegetables and low glycemic impact fruits and 1/3 lean proteins. Avoid processed/refined carbohydrates (especially bread products, bakery items, sugary drinks, cereals, crackers and sweets). Minimum 30 minutes of cardio and weight training/resistance exercise daily to build muscle, reduce insulin sensitivity and increase resting fat & calorie burning capacity. TESTING RESULTS Normal results will be released to [facility] or sent by [address] [facility] mail. [address] [facility] #[phone]. Results of tests performed at outside facilities will not appear in [address] If you have questions about your results, please feel free to schedule a follow up appointment to discuss your concerns with your PCP. MEDICATION REFILLS -- Medication refills should be requested at least 2 business days in advance through your pharmacy. Refills will not be provided by the after hours/on call provider. Introducing [facility] SERVICES! Dear Elin Roles: Thank you for requesting a [employer] account. Our records indicate that you already have an active [employer] account. You can access your account anytime at [email or web address] Did you know that you can access your hospital and ER discharge instructions at any time in [employer]? You can also review all of your test results from your hospital stay or ER visit. Additional Information If you have questions, please visit the Frequently Asked Questions section of the [employer] website at [email or web address] Remember, [employer] is NOT to be used for urgent needs. For medical emergencies, dial 911. Now available from your iPhone and Android! Please provide this summary of care documentation to your next provider. Your primary care clinician is listed as Curtis Galeano. If you have any questions after today's visit, please call [phone].

## 2018-04-13 NOTE — PATIENT INSTRUCTIONS
Dermatology appointment today at 11:30am with [facility] [address] [place] [address] [address]  [phone]    [facility]! Your doctor recommends a lifestyle that incorporates daily exercise and a diet focused on whole, unprocessed foods. Aim to follow a diet of 2/3 non-starchy vegetables and low glycemic impact fruits and 1/3 lean proteins. Avoid processed/refined carbohydrates (especially bread products, bakery items, sugary drinks, cereals, crackers and sweets). Minimum 30 minutes of cardio and weight training/resistance exercise daily to build muscle, reduce insulin sensitivity and increase resting fat & calorie burning capacity. TESTING RESULTS   Normal results will be released to [employer] or sent by [address] mail. [address] [facility] #[phone]. Results of tests performed at outside facilities will not appear in [address] If you have questions about your results, please feel free to schedule a follow up appointment to discuss your concerns with your PCP. MEDICATION REFILLS      -- Medication refills should be requested at least 2 business days in advance through your pharmacy. Refills will not be provided by the after hours/on call provider.

## 2018-04-13 NOTE — PROGRESS NOTES
Sudhir Porras is a 33 y.o. female (: 1990) presenting to address:    Chief Complaint   Patient presents with    Rash     x 1 week       Vitals:    18 0931   BP: 102/71   Pulse: 78   Resp: 20   Temp: 98.6 °F (37 °C)   TempSrc: Oral   SpO2: 99%   Weight: 136 lb (61.7 kg)   Height: 5' 8\" (1.727 m)   LMP: 2018       Hearing/Vision:   No exam data present    Learning Assessment:     Learning Assessment 2015   PRIMARY LEARNER Patient   HIGHEST LEVEL OF EDUCATION - PRIMARY LEARNER  > 4 YEARS OF COLLEGE   BARRIERS PRIMARY LEARNER NONE   PRIMARY LANGUAGE ENGLISH   LEARNER PREFERENCE PRIMARY OTHER (COMMENT)   ANSWERED BY patient   RELATIONSHIP SELF     Depression Screening:     PHQ over the last two weeks 2018   Little interest or pleasure in doing things Not at all   Feeling down, depressed or hopeless Not at all   Total Score PHQ 2 0     Fall Risk Assessment:   No flowsheet data found. Abuse Screening:     Abuse Screening Questionnaire 2016   Do you ever feel afraid of your partner? N   Are you in a relationship with someone who physically or mentally threatens you? N   Is it safe for you to go home? Y     Coordination of Care Questionaire:   1. Have you been to the ER, urgent care clinic since your last visit? Hospitalized since your last visit? NO    2. Have you seen or consulted any other health care providers outside of the [address] [place] [address] since your last visit? Include any pap smears or colon screening. NO    Advanced Directive:   1. Do you have an Advanced Directive? NO    2. Would you like information on Advanced Directives?  NO

## 2018-04-30 LAB — MAGNESIUM SERPL-MCNC: 2.1 MG/DL (ref 1.6–2.5)

## 2018-05-01 NOTE — PROGRESS NOTES
Your thyroid testing was also normal:    THYROID STUDIES (4/30/2018 @[facility])  TSH 1.14, free T4  1.2, free T3 3.4

## 2018-05-03 ENCOUNTER — OFFICE VISIT (OUTPATIENT)
Dept: FAMILY MEDICINE CLINIC | Age: 28
End: 2018-05-03

## 2018-05-03 VITALS
RESPIRATION RATE: 20 BRPM | WEIGHT: 132.4 LBS | BODY MASS INDEX: 20.07 KG/M2 | HEIGHT: 68 IN | OXYGEN SATURATION: 98 % | HEART RATE: 102 BPM | TEMPERATURE: 98.1 F | SYSTOLIC BLOOD PRESSURE: 110 MMHG | DIASTOLIC BLOOD PRESSURE: 80 MMHG

## 2018-05-03 DIAGNOSIS — D75.89 MACROCYTOSIS: ICD-10-CM

## 2018-05-03 DIAGNOSIS — R53.83 MALAISE AND FATIGUE: Primary | ICD-10-CM

## 2018-05-03 DIAGNOSIS — D80.2 IGA DEFICIENCY (HCC): Chronic | ICD-10-CM

## 2018-05-03 DIAGNOSIS — R11.2 NON-INTRACTABLE VOMITING WITH NAUSEA, UNSPECIFIED VOMITING TYPE: ICD-10-CM

## 2018-05-03 DIAGNOSIS — Z96.89 S/P PLACEMENT OF VNS (VAGUS NERVE STIMULATION) DEVICE: Chronic | ICD-10-CM

## 2018-05-03 DIAGNOSIS — R53.81 MALAISE AND FATIGUE: Primary | ICD-10-CM

## 2018-05-03 DIAGNOSIS — R56.9 SEIZURES (HCC): Chronic | ICD-10-CM

## 2018-05-03 DIAGNOSIS — T38.0X5A ADVERSE EFFECT OF PREDNISONE, INITIAL ENCOUNTER: ICD-10-CM

## 2018-05-03 DIAGNOSIS — R53.83 MALAISE AND FATIGUE: ICD-10-CM

## 2018-05-03 DIAGNOSIS — R53.81 MALAISE AND FATIGUE: ICD-10-CM

## 2018-05-03 NOTE — PROGRESS NOTES
Peri Harris is a 33 y.o. female (: 1990) presenting to address:    Chief Complaint   Patient presents with    Abdominal Pain       Vitals:    18 1437   BP: 110/80   Pulse: (!) 102   Resp: 20   Temp: 98.1 °F (36.7 °C)   TempSrc: Oral   SpO2: 98%   Weight: 132 lb 6.4 oz (60.1 kg)   Height: 5' 8\" (1.727 m)   PainSc:   3   PainLoc: Abdomen   LMP: 2018       Hearing/Vision:   No exam data present    Learning Assessment:     Learning Assessment 2015   PRIMARY LEARNER Patient   HIGHEST LEVEL OF EDUCATION - PRIMARY LEARNER  > 4 YEARS OF COLLEGE   BARRIERS PRIMARY LEARNER NONE   PRIMARY LANGUAGE ENGLISH   LEARNER PREFERENCE PRIMARY OTHER (COMMENT)   ANSWERED BY patient   RELATIONSHIP SELF     Depression Screening:     PHQ over the last two weeks 5/3/2018   Little interest or pleasure in doing things Not at all   Feeling down, depressed or hopeless Not at all   Total Score PHQ 2 0     Fall Risk Assessment:   No flowsheet data found. Abuse Screening:     Abuse Screening Questionnaire 5/3/2018   Do you ever feel afraid of your partner? N   Are you in a relationship with someone who physically or mentally threatens you? N   Is it safe for you to go home? Y     Coordination of Care Questionaire:   1. Have you been to the ER, urgent care clinic since your last visit? Hospitalized since your last visit? YES [facility]    2. Have you seen or consulted any other health care providers outside of the [address] [place] [address] since your last visit? Include any pap smears or colon screening. YES neuro, derm    Advanced Directive:   1. Do you have an Advanced Directive? NO    2. Would you like information on Advanced Directives?  NO

## 2018-05-03 NOTE — PROGRESS NOTES
PRE-VISIT PLANNING:     No future appointments. Bo Lemus (PCP is Bo Lemus MD) is scheduled for an office visit with Bo Lemus MD on 2018 for evaluation of lethargy and forgetfulness. Encounter opened prior to visit to complete pre-visit planning, update and review pertinent sections in the chart. Last office visit with PCP was 2018. Rooming Nurse Items:  -- Release for last Pap smear report    Pending items for provider to review with patient:  -- Normal thyroid studies 18 @[facility]  -- Check with immunologist re: [address] Maintenance Due   Topic Date Due    PAP AKA CERVICAL CYTOLOGY  2012    Pneumococcal 19-64 Highest Risk (3 of 3 - PCV13) 2017          Internal Medicine  Acute Care Visit  [facility]  [address], [place], [facility], [address]  Phone [phone]; Fax [phone]    Date of Service:  2018  Patient's Name & :   Bo Lemus - 1990   Patient's PCP:  Bo Lemus MD     IMPRESSION/PLAN:     Bo Lemus is a 33 y.o. female who was seen today for an acute care visit. The primary encounter diagnosis was Malaise and fatigue. Diagnoses of Macrocytosis, IgA deficiency ([address] Seizures ([address] S/P placement of VNS (vagus nerve stimulation) device, and Adverse effect of prednisone, initial encounter were also pertinent to this visit. Acute viral gastroenteritis - supportive mgmt, hydration  Neurology managing seizures, patient has appt tomorrow to discuss recent fatigue and seizures (?post ictal contribution to symptoms)  Unclear cause of fatigue, decreased activity tolerance, muscle fatigue/weakness - wouldn't expect post-ictal state to last more than 24 hrs following seizures, recent prednisone use places her at risk for adrenal insufficiency. Check for treatable medical issues that could contribute.   Patient had normal mag and normal TFTs 4/30/18. Patient to have labs drawn ~7am   FMLA forms completed today   Body mass index is 20.13 kg/(m^2). Discussed achieving/maintaining healthy weight and BMI. Follow up BMI/weight at next visit. Orders Placed This Encounter    CORTISOL, AM    ACTH    ALT    AST    T4, FREE    CREATININE    HEAVY METALS PROFILE I, BLOOD    METHYLMALONIC ACID    T PALLIDUM AB    VITAMIN B1, WHOLE BLOOD    VITAMIN B12    MONO SCREEN W/ REFLX EBV       Patient Instructions   Check with your immunologist about Prevnar 13 and whether you should get it or not. Labs around 7am.      The patient states understanding of recommended treatment plan. I spent greater than 50% of the 12130 total minutes of today's visit in coordination of care and face to face counseling regarding the above patient concerns, my assessment and recommendations regarding evaluation and management. Jessica Mckinnon MD - Internal Medicine  5/3/2018, 2:47 PM    Subjective/Discussion        Chief Complaint   Patient presents with    Abdominal Pain       Enid Castano is a 33 y.o. female complaining of several weeks of lethargy, family/coworkers/friends noting she seems mentally slow and lower energy since ~4/21/18. Patient symptom log submitted for scanning. Increased sleep demands - getting home at 5pm, sleeping until 8p, getting up for dinner, then going back to bed around 9-9:30pm and gets up at [address] [place] [address] rested in the morning for about an hour, then abruptly feels fatigued. Legs feel tired, had to pause half-way down the steps the other day. No muscle pains until today when she started having watery diarrhea. Exposed to sick child with viral gastroenteritis yesterday at work and started having LGFs last night. Some nausea, no vomiting. 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 Has been having seizures.   Struck head during seizure 3/4/18 while sick. Had tonic seizures x 2 on 4/21/18, one while sleeping in car, one in evening was found squatting in a ball on the floor, had vomiting after 2nd seizure which is not atypical for her. Took lorazepam at her parents home. Had CT head (normal) 4/26/18 @[facility]. She is still undergoing VNS titration with her neurologist, kimberly . Is only up to 0.75mA, has been having vocal catching and sensations of fullness in throat that have slowed up-titration. Migraine on 4/27/18, had vomiting prior. Overall, feels mental slowing is improving, but is still not feeling back to baseline and energy levels are still very low - still exhausted after about 1 hour after getting up for the day. Missing a lot of work. Needs FMLA forms updated today. Review of Systems   Constitutional: Positive for malaise/fatigue. Negative for chills, diaphoresis, fever and weight loss. HENT: Negative for congestion, ear discharge, ear pain, hearing loss, nosebleeds, sinus pain, sore throat and tinnitus. Eyes: Negative for blurred vision, double vision, photophobia, pain, discharge and redness. Respiratory: Negative for cough, hemoptysis, sputum production, shortness of breath, wheezing and stridor. Cardiovascular: Negative for chest pain, palpitations, orthopnea, claudication, leg swelling and PND. Gastrointestinal: Positive for diarrhea and nausea. Negative for abdominal pain, blood in stool, constipation, heartburn, melena and vomiting. Genitourinary: Negative for dysuria, flank pain, frequency, hematuria and urgency. Musculoskeletal: Negative for back pain, falls, joint pain, myalgias and neck pain. Skin: Negative for itching and rash. Neurological: Positive for dizziness, seizures and weakness. Negative for tingling, tremors, sensory change, speech change, focal weakness, loss of consciousness and headaches. Endo/Heme/Allergies: Negative for polydipsia. Does not bruise/bleed easily. Psychiatric/Behavioral: Negative for depression, hallucinations, memory loss, substance abuse and suicidal ideas. The patient is not nervous/anxious and does not have insomnia. Objective:     /80 (BP 1 Location: Left arm, BP Patient Position: Sitting)  Pulse (!) 102  Temp 98.1 °F (36.7 °C) (Oral)   Resp 20  Ht 5' 8\" (1.727 m)  Wt 132 lb 6.4 oz (60.1 kg)  LMP 04/04/2018  SpO2 98%  BMI 20.13 kg/m2    Physical Exam   Constitutional: She is oriented to person, place, and time. She appears well-developed and well-nourished. No distress. HENT:   Head: Normocephalic and atraumatic. Right Ear: External ear normal.   Left Ear: External ear normal.   Nose: Nose normal.   Mouth/Throat: Oropharynx is clear and moist.   Eyes: Conjunctivae and EOM are normal. Right eye exhibits no discharge. Left eye exhibits no discharge. No scleral icterus. Neck: Neck supple. Cardiovascular: Normal rate, regular rhythm, normal heart sounds and intact distal pulses. Pulmonary/Chest: Effort normal and breath sounds normal. No respiratory distress. Neurological: She is alert and oriented to person, place, and time. She exhibits normal muscle tone. Coordination normal.   Skin: Skin is warm and dry. She is not diaphoretic. Psychiatric: She has a normal mood and affect.  Her behavior is normal. Judgment and thought content normal.       Additional History     Past Medical History:   Diagnosis Date    Acne vulgaris     Currently on 2nd course of Acutane, sees Dr. Gracia Anderson Asthma     Exercise-induced asthma, may have been from chronic bronchitis; doing well on Advair     Family history of thyroid disease in sister - Hashimoto's     IgA deficiency ([facility] [address] [facility]     Initially thought to be secondary to Carbatrol; followed by pediatric allergy/immunology through [facility], has not resolved with med change; every 5 year PPSV23    Migraine without aura and without status migrainosus, not intractable 2/14/2017    Nasal bone fracture 01/02/2018    Seizure 1/2/18, LOC, struck nose on bathroom counter, Dr. Francine Bernheim closed reduction     Routine adult health maintenance 4/2/2014 4/2/2014:   -- Diabetes mellitus:  Normal GTT (1 hour) in "[employer] system  -- Vaccinations: Influenza vaccine recommended annually, patient sees immunologist for IgA deficiency -- Weight:  Body mass index is 21.29 kg/(m^2). Discussed the patient's BMI with her. The BMI follow up plan is as follows:  Healthy BMI -- Cervical cancer:    Patient to schedule Pap smear with Dr. Blanca Franco in [place]     Seizures [facility]) 9years old    Absence seizures originally, developed tonic-clonic seizures in 2007; Followed by Dr. Indira Moreno      Past Surgical History:   Procedure Laterality Date    HX OTHER SURGICAL N/A 01/25/2018    [email or web address]     HX OTHER SURGICAL      Vagus nerve stimulator     HX WISDOM TEETH EXTRACTION  2009     Social History   Substance Use Topics    Smoking status: Never Smoker    Smokeless tobacco: Never Used    Alcohol use Yes      Comment: 1/wk     Current Outpatient Prescriptions   Medication Sig    ondansetron (ZOFRAN ODT) 8 mg disintegrating tablet TAKE 1 TABLET BY MOUTH EVERY 12 HOURS AS NEEDED FOR NAUSEA    fluticasone-vilanterol (BREO ELLIPTA) 200-25 mcg/dose inhaler Take 1 Puff by inhalation daily.  albuterol sulfate (PROAIR RESPICLICK) 90 mcg/actuation aepb Take 90 mcg by mouth four (4) times daily.  SUMAtriptan (IMITREX) 20 mg/actuation nasal spray 1 Spray as needed for Migraine.  cholecalciferol (VITAMIN D3) 5,000 unit capsule Take 1 Cap by mouth daily.  zonisamide (ZONEGRAN) 100 mg capsule Take 200 mg by mouth two (2) times a day.  CETIRIZINE HCL (ALLER-MARSHALL PO) Take  by mouth.  ranitidine (ZANTAC) 75 mg tablet Take 75 mg by mouth two (2) times a day.  calcium-cholecalciferol, d3, (CALCIUM 600 + D) 600-125 mg-unit tab Take  by mouth.     folic acid (FOLVITE) 1 mg tablet Take  by mouth daily.  Lamotrigine (LAMICTAL XR) 200 mg tr24 Take 300 mg by mouth two (2) times a day.  azithromycin (ZITHROMAX) 250 mg tablet Take 2 tabs on day 1 then take 1 tab daily for remainder of 10 day course. No current facility-administered medications for this visit. Allergies   Allergen Reactions    Amoxicillin Hives     Keflex tolerated    Levaquin [Levofloxacin] Seizures     Seizure - lowered seizure threshold    Sulfa (Sulfonamide Antibiotics) Other (comments)     Vision and balance problem while on Vimpat, but had previously tolerated Bactrim well       Encounter Diagnoses:     Encounter Diagnoses     ICD-10-CM ICD-9-CM   1. Malaise and fatigue R53.81 780.79    R53.83    2. Macrocytosis D75.89 289.89   3. IgA deficiency ([facility] [address] D80.2 279.01   4. Seizures ([facility] R56.9 780.39   5. S/P placement of VNS (vagus nerve stimulation) device Z96.89 V45.89   6. Adverse effect of prednisone, initial encounter T38.0X5A E932.0            This document may have been created with the aid of dictation software. Text may contain errors, particularly phonetic errors.

## 2018-05-03 NOTE — PATIENT INSTRUCTIONS
Check with your immunologist about Prevnar 13 and whether you should get it or not.     Labs around 7am.

## 2018-05-03 NOTE — MR AVS SNAPSHOT
[address] [place] [address] [address] [facility] [id number] [phone] Patient: Светлана Burgess MRN: [id number] CWA:4/03/0315 Visit Information Date & Time Provider Department Dept. Phone Encounter #  
 5/3/2018  2:30 PM Светлана Burgess, 3 [facility] [phone] [id number] Upcoming Health Maintenance Date Due  
 PAP AKA CERVICAL CYTOLOGY 4/19/2012 Pneumococcal 19-64 Highest Risk (3 of 3 - PCV13) 8/22/2017 Influenza Age 5 to Adult 8/1/2018 DTaP/Tdap/Td series (3 - Td) 10/21/2021 Allergies as of 5/3/2018  Review Complete On: 5/3/2018 By: Vanessa Cool LPN Severity Noted Reaction Type Reactions Amoxicillin High 04/01/2014   Intolerance Hives Keflex tolerated Levaquin [Levofloxacin] High 01/27/2016   Side Effect Seizures Seizure - lowered seizure threshold Sulfa (Sulfonamide Antibiotics)  10/20/2015    Other (comments) Vision and balance problem while on Vimpat, but had previously tolerated Bactrim well Current Immunizations  Reviewed on 12/9/2016 Name Date Hep A Vaccine (Adult) 5/24/2012, 10/21/2011 Hep B, Adol/Ped 4/20/1993, 10/15/1992 IPV 10/21/2011, 4/20/1995 Influenza Vaccine 10/20/2015 10:59 AM  
 Influenza Vaccine (Quad) PF 10/5/2017  3:31 PM  
 Influenza Vaccine PF 10/29/2014  4:04 PM  
 Japanese Encephalitis Vaccine (SQ) 11/23/2011, 10/21/2011 MMR 4/20/1995, 7/20/1992 Meningococcal (MPSV4) Vaccine 11/15/2008 Pneumococcal Polysaccharide (PPSV-23) 8/22/2016, 7/1/2011 Td 10/7/2005 Tdap 10/21/2011 Typhoid Vi Capsular Polysaccharide Vaccine 10/21/2011 Not reviewed this visit You Were Diagnosed With   
  
 Codes Comments Malaise and fatigue    -  Primary ICD-10-CM: R53.81, R53.83 ICD-9-CM: 780.79 Macrocytosis     ICD-10-CM: D75.89 ICD-9-CM: 289.89 IgA deficiency ([address]     ICD-10-CM: D80.2 ICD-9-CM: 279.01   
 Seizures ([facility]     ICD-10-CM: R56.9 ICD-9-CM: 780.39 S/P placement of VNS (vagus nerve stimulation) device     ICD-10-CM: Z96.89 
ICD-9-CM: V45.89 Adverse effect of prednisone, initial encounter     ICD-10-CM: T38.0X5A 
ICD-9-CM: E932.0 Vitals BP Pulse Temp Resp Height(growth percentile) Weight(growth percentile) 110/80 (BP 1 Location: Left arm, BP Patient Position: Sitting) (!) 102 98.1 °F (36.7 °C) (Oral) 20 5' 8\" (1.727 m) 132 lb 6.4 oz (60.1 kg) LMP SpO2 BMI OB Status Smoking Status 04/04/2018 98% 20.13 kg/m2 Having regular periods Never Smoker Vitals History BMI and BSA Data Body Mass Index Body Surface Area  
 20.13 kg/m 2 1.7 m 2 Preferred Pharmacy Pharmacy Name Phone [facility] [phone]  [address] [phone] Your Updated Medication List  
  
   
This list is accurate as of 5/3/18  3:25 PM.  Always use your most recent med list.  
  
  
  
  
 ALLER-MARSHALL PO Take  by mouth. azithromycin 250 mg tablet Commonly known as:  Sergio Plata Take 2 tabs on day 1 then take 1 tab daily for remainder of 10 day course. BREO ELLIPTA 200-25 mcg/dose inhaler Generic drug:  fluticasone-vilanterol Take 1 Puff by inhalation daily. CALCIUM 600 + D 600-125 mg-unit Tab Generic drug:  calcium-cholecalciferol (d3) Take  by mouth. cholecalciferol 5,000 unit capsule Commonly known as:  VITAMIN D3 Take 1 Cap by mouth daily. folic acid 1 mg tablet Commonly known as:  Google Take  by mouth daily. LaMICtal  mg Tr24 ER tablet Generic drug:  lamoTRIgine Take 300 mg by mouth two (2) times a day. ondansetron 8 mg disintegrating tablet Commonly known as:  ZOFRAN ODT  
TAKE 1 TABLET BY MOUTH EVERY 12 HOURS AS NEEDED FOR NAUSEA PROAIR RESPICLICK 90 mcg/actuation Aepb Generic drug:  albuterol sulfate Take 90 mcg by mouth four (4) times daily. raNITIdine 75 mg tablet Commonly known as:  ZANTAC Take 75 mg by mouth two (2) times a day. SUMAtriptan 20 mg/actuation nasal spray Commonly known as:  IMITREX  
1 Spray as needed for Migraine. zonisamide 100 mg capsule Commonly known as:  Ruthe Horns Take 200 mg by mouth two (2) times a day. To-Do List   
 05/03/2018 Lab:  ACTH   
  
 05/03/2018 Lab:  ALT   
  
 05/03/2018 Lab:  AST   
  
 05/03/2018 Lab:  CORTISOL, AM   
  
 05/03/2018 Lab:  CREATININE   
  
 05/03/2018 Lab:  HEAVY METALS PROFILE I, BLOOD   
  
 05/03/2018 Lab:  METHYLMALONIC ACID   
  
 05/03/2018 Lab:  MONO SCREEN W/ REFLX EBV   
  
 05/03/2018 Lab:  T PALLIDUM AB   
  
 05/03/2018 Lab:  T4, FREE   
  
 05/03/2018 Lab:  VITAMIN B1, WHOLE BLOOD   
  
 05/03/2018 Lab:  VITAMIN B12 Patient Instructions Check with your immunologist about Prevnar 13 and whether you should get it or not. Labs around 7am.   
 
 
  
Introducing [facility] SERVICES! Dear Erik Browne: Thank you for requesting a [employer] account. Our records indicate that you already have an active [employer] account. You can access your account anytime at [email or web address]. [employer] Did you know that you can access your hospital and ER discharge instructions at any time in [employer]? You can also review all of your test results from your hospital stay or ER visit. Additional Information If you have questions, please visit the Frequently Asked Questions section of the [employer] website at [email or web address] [employer]/. Remember, [employer] is NOT to be used for urgent needs. For medical emergencies, dial 911. Now available from your iPhone and Android! Please provide this summary of care documentation to your next provider. Your primary care clinician is listed as Jerilyn Jones.  If you have any questions after today's visit, please call [phone].

## 2018-05-07 ENCOUNTER — TELEPHONE (OUTPATIENT)
Dept: FAMILY MEDICINE CLINIC | Age: 28
End: 2018-05-07

## 2018-05-07 NOTE — TELEPHONE ENCOUNTER
Spoke with Jeana at [facility], she states these test must be in pink tubes frozen. I will call the pt and have her come in to redo.

## 2018-05-07 NOTE — TELEPHONE ENCOUNTER
Pt states she was in the ER yesterday due to having bad symptoms from Vertigo. Pt wants to know if she could be prescribed Antivert and have it sent to: [facility] mail service pharmacy listed.

## 2018-05-07 NOTE — TELEPHONE ENCOUNTER
Jeana of [facility] states they are unable to run the two test for Thiamine test and ACTH test because the office did not receive right specimen. It was described as a frozen pink tube or a frozen lavender tube. Please advise.

## 2018-05-08 DIAGNOSIS — R53.81 MALAISE AND FATIGUE: ICD-10-CM

## 2018-05-08 DIAGNOSIS — T38.0X5A ADVERSE EFFECT OF PREDNISONE, INITIAL ENCOUNTER: ICD-10-CM

## 2018-05-08 DIAGNOSIS — D75.89 MACROCYTOSIS: ICD-10-CM

## 2018-05-08 DIAGNOSIS — R53.83 MALAISE AND FATIGUE: ICD-10-CM

## 2018-05-08 RX ORDER — MECLIZINE HCL 12.5 MG 12.5 MG/1
12.5 TABLET ORAL
Qty: 180 TAB | Refills: 1 | Status: SHIPPED | OUTPATIENT
Start: 2018-05-08 | End: 2018-05-18

## 2018-05-08 NOTE — TELEPHONE ENCOUNTER
Orders Placed This Encounter    meclizine (ANTIVERT) 12.5 mg tablet     Sig: Take 1 Tab by mouth every twelve (12) hours as needed for up to 10 days. Indications: Vertigo     Dispense:  180 Tab     Refill:  1     That is fine, script was sent.

## 2018-05-10 LAB
ALT SERPL-CCNC: 21 U/L (ref 5–40)
AST SERPL W P-5'-P-CCNC: 20 U/L (ref 10–37)
CORTISOL, SERUM AM,CORTA: 10.28 MCG/DL
CREAT SERPL-MCNC: 0.9 MG/DL (ref 0.5–1.2)
GFRAA, 66117: >60
GFRNA, 66118: >60
LEAD BLD-MCNC: NORMAL UG/DL
Lab: 3 UG/L
Lab: NORMAL UG/L
METHYLMALONIC ACID: 170 NMOL/L
MISCELLANEOUS TEST,99000: NORMAL
SENT TO, 434: NORMAL
T4 FREE SERPL-MCNC: 1 NG/DL (ref 0.9–1.8)
TEST NAME, 435: NORMAL
TREPONEMA PALLIDUM AB: NON REACTIVE
VIT B12 SERPL-MCNC: 553 PG/ML (ref 211–911)

## 2018-05-10 RX ORDER — SUMATRIPTAN 20 MG/1
1 SPRAY NASAL AS NEEDED
Qty: 10 CONTAINER | Refills: 11 | Status: SHIPPED | OUTPATIENT
Start: 2018-05-10 | End: 2019-06-11 | Stop reason: SDUPTHER

## 2018-05-12 LAB
Lab: 15 PG/ML
VITAMIN B1, WHOLE BLOOD, 66250: 119.8 NMOL/L

## 2018-05-17 DIAGNOSIS — Z86.19 FREQUENT INFECTIONS: Primary | ICD-10-CM

## 2018-05-17 DIAGNOSIS — D80.2 IGA DEFICIENCY (HCC): Chronic | ICD-10-CM

## 2018-05-17 DIAGNOSIS — R11.2 NON-INTRACTABLE VOMITING WITH NAUSEA, UNSPECIFIED VOMITING TYPE: ICD-10-CM

## 2018-05-21 LAB
GLIADIN IGA, GIGA: <0.5 U/ML
GLIADIN IGG, GIGG: <0.5 U/ML
IMMUNOGLOBULIN A, QN, SERUM, 001784: 12 MG/DL (ref 70–400)
IMMUNOGLOBULIN A, QN, SERUM, 001784: 16 MG/DL (ref 70–400)
IMMUNOGLOBULIN G, QN, SERUM, 001776: 1203 MG/DL (ref 700–1600)
IMMUNOGLOBULIN M, QN, SERUM, 001792: 71 MG/DL (ref 56–352)
Lab: 1166 MG/DL
Lab: 4 MG/DL
Lab: 429 MG/DL
Lab: 672 MG/DL
Lab: 87 MG/DL
T-TRANSGLUTAMINASE, IGA, 164643: <0.5 U/ML
TTG IGG SER-ACNC: <0.8 U/ML

## 2018-05-22 NOTE — PROGRESS NOTES
IgA levels are low. No signs of celiac disease. Your other immunoglobulin levels are normal.  I wouldn't expect this to cause symptoms other than increased susceptibility to infections.

## 2018-05-23 DIAGNOSIS — R11.2 NON-INTRACTABLE VOMITING WITH NAUSEA, UNSPECIFIED VOMITING TYPE: ICD-10-CM

## 2018-05-25 RX ORDER — ONDANSETRON 8 MG/1
TABLET, ORALLY DISINTEGRATING ORAL
Qty: 20 TAB | Refills: 0 | Status: SHIPPED | OUTPATIENT
Start: 2018-05-25 | End: 2018-10-07 | Stop reason: SDUPTHER

## 2018-09-21 ENCOUNTER — OFFICE VISIT (OUTPATIENT)
Dept: FAMILY MEDICINE CLINIC | Age: 28
End: 2018-09-21

## 2018-09-21 VITALS
DIASTOLIC BLOOD PRESSURE: 60 MMHG | RESPIRATION RATE: 16 BRPM | HEART RATE: 88 BPM | OXYGEN SATURATION: 100 % | WEIGHT: 137 LBS | BODY MASS INDEX: 20.76 KG/M2 | TEMPERATURE: 98.6 F | HEIGHT: 68 IN | SYSTOLIC BLOOD PRESSURE: 115 MMHG

## 2018-09-21 DIAGNOSIS — R68.89 FLU-LIKE SYMPTOMS: Primary | ICD-10-CM

## 2018-09-21 LAB
FLUAV+FLUBV AG NOSE QL IA.RAPID: NEGATIVE POS/NEG
FLUAV+FLUBV AG NOSE QL IA.RAPID: NEGATIVE POS/NEG
VALID INTERNAL CONTROL?: YES

## 2018-09-21 NOTE — PROGRESS NOTES
Aurelia Murray is a 33 y.o. female (: 1990) presenting to address: Chief Complaint Patient presents with  Cough  Sore Throat Vitals:  
 18 1419 BP: 115/60 Pulse: 88 Resp: 16 Temp: 98.6 °F (37 °C) TempSrc: Oral  
SpO2: 100% Weight: 137 lb (62.1 kg) Height: 5' 8\" (1.727 m) PainSc:   0 - No pain Hearing/Vision: No exam data present Learning Assessment:  
 
Learning Assessment 2015 PRIMARY LEARNER Patient HIGHEST LEVEL OF EDUCATION - PRIMARY LEARNER  > 4 YEARS OF COLLEGE  
BARRIERS PRIMARY LEARNER NONE PRIMARY LANGUAGE ENGLISH  
LEARNER PREFERENCE PRIMARY OTHER (COMMENT) ANSWERED BY patient RELATIONSHIP SELF Depression Screening: PHQ over the last two weeks 2018 Little interest or pleasure in doing things Not at all Feeling down, depressed, irritable, or hopeless Not at all Total Score PHQ 2 0 Fall Risk Assessment:  
No flowsheet data found. Abuse Screening:  
 
Abuse Screening Questionnaire 5/3/2018 Do you ever feel afraid of your partner? [place] Liz Are you in a relationship with someone who physically or mentally threatens you? [place] Liz Is it safe for you to go home? [facility] Coordination of Care Questionaire: 1. Have you been to the ER, urgent care clinic since your last visit? Hospitalized since your last visit? NO 
 
2. Have you seen or consulted any other health care providers outside of the [address] [place] [address] since your last visit? Include any pap smears or colon screening. YES  Neurology, Endo Advanced Directive: 1. Do you have an Advanced Directive? NO 
 
2. Would you like information on Advanced Directives?  NO

## 2018-09-21 NOTE — PROGRESS NOTES
HISTORY OF PRESENT ILLNESS Madonna Early is a 33 y.o. female. HPI 
C/o nasal congestion, mild dry cough, and sore throat for 2 days, associated with fever Review of Systems Constitutional: Negative for chills. HENT: Negative for ear pain. Respiratory: Negative for shortness of breath and wheezing. Cardiovascular: Negative for chest pain. Physical Exam  
HENT:  
Right Ear: Tympanic membrane normal.  
Left Ear: Tympanic membrane normal.  
Nose: Nose normal.  
Mouth/Throat: No oropharyngeal exudate. Neck: Neck supple. Cardiovascular: Normal rate, regular rhythm and normal heart sounds. Pulmonary/Chest: Effort normal and breath sounds normal. She has no rales. Lymphadenopathy:  
  She has no cervical adenopathy. Vitals reviewed. ASSESSMENT and PLAN Diagnoses and all orders for this visit: 1. Flu-like symptoms -     AMB POC CAITLIN INFLUENZA A/B TEST, negative 
otc claritin d 
otc mucinex DM Results for orders placed or performed in visit on 09/21/18 AMB POC CAITLIN INFLUENZA A/B TEST Result Value Ref Range VALID INTERNAL CONTROL POC Yes Influenza A Ag POC Negative Negative Pos/Neg  Influenza B Ag POC Negative Negative Pos/Neg

## 2018-09-28 RX ORDER — AZITHROMYCIN 250 MG/1
TABLET, FILM COATED ORAL
Qty: 6 TAB | Refills: 0 | Status: SHIPPED | OUTPATIENT
Start: 2018-09-28 | End: 2018-10-03

## 2018-10-07 DIAGNOSIS — R11.2 NON-INTRACTABLE VOMITING WITH NAUSEA, UNSPECIFIED VOMITING TYPE: ICD-10-CM

## 2018-10-08 RX ORDER — ONDANSETRON 8 MG/1
TABLET, ORALLY DISINTEGRATING ORAL
Qty: 20 TAB | Refills: 0 | Status: SHIPPED | OUTPATIENT
Start: 2018-10-08 | End: 2018-12-28 | Stop reason: SDUPTHER

## 2018-12-19 ENCOUNTER — TELEPHONE (OUTPATIENT)
Dept: FAMILY MEDICINE CLINIC | Age: 28
End: 2018-12-19

## 2018-12-19 DIAGNOSIS — Z00.00 PREVENTATIVE HEALTH CARE: Primary | ICD-10-CM

## 2018-12-19 NOTE — TELEPHONE ENCOUNTER
Pt is scheduled for her annual cpe. Please review her chart and order her labs accordingly.      Future Appointments   Date Time Provider Gera Wheeleri   12/24/2018  1:30 PM Richard Lu  [place]

## 2018-12-21 DIAGNOSIS — Z00.00 PREVENTATIVE HEALTH CARE: ICD-10-CM

## 2018-12-21 NOTE — TELEPHONE ENCOUNTER
Future Appointments   Date Time Provider Gera Wheeleri   12/24/2018  1:30 PM Liz Avila MD [facility]        Lab orders to be completed 1-2 weeks prior to scheduled office visit. Orders Placed This Encounter    CBC WITH AUTOMATED DIFF    METABOLIC PANEL, COMPREHENSIVE    HEMOGLOBIN A1C WITH EAG    LIPID PANEL    TSH AND FREE T4    URINALYSIS W/ RFLX MICROSCOPIC    VITAMIN D, 25 HYDROXY     Fasting x10 hours required if lab orders include LIPID PANEL or HEMOGLOBIN A1C.

## 2018-12-24 ENCOUNTER — OFFICE VISIT (OUTPATIENT)
Dept: FAMILY MEDICINE CLINIC | Age: 28
End: 2018-12-24

## 2018-12-24 VITALS
SYSTOLIC BLOOD PRESSURE: 108 MMHG | BODY MASS INDEX: 20.46 KG/M2 | DIASTOLIC BLOOD PRESSURE: 68 MMHG | HEIGHT: 68 IN | OXYGEN SATURATION: 99 % | RESPIRATION RATE: 16 BRPM | HEART RATE: 58 BPM | WEIGHT: 135 LBS | TEMPERATURE: 97.6 F

## 2018-12-24 DIAGNOSIS — J45.20 MILD INTERMITTENT ASTHMA WITHOUT COMPLICATION: Chronic | ICD-10-CM

## 2018-12-24 DIAGNOSIS — G43.009 MIGRAINE WITHOUT AURA AND WITHOUT STATUS MIGRAINOSUS, NOT INTRACTABLE: ICD-10-CM

## 2018-12-24 DIAGNOSIS — R56.9 SEIZURES (HCC): Chronic | ICD-10-CM

## 2018-12-24 DIAGNOSIS — D80.2 IGA DEFICIENCY (HCC): Primary | Chronic | ICD-10-CM

## 2018-12-24 DIAGNOSIS — Z96.89 S/P PLACEMENT OF VNS (VAGUS NERVE STIMULATION) DEVICE: Chronic | ICD-10-CM

## 2018-12-24 NOTE — PROGRESS NOTES
PRE-VISIT PLANNING:     PATIENT NAME:  Toni Martin   :  1990   PCP:  Yumiko Ross MD     Future Appointments   Date Time Provider Gera Griffin   2018  1:30 PM Yumiko Ross MD CECILIA Shrestha is scheduled for an office visit with Cece Limon MD on 2018 for preventive visit. Encounter opened prior to visit to complete pre-visit planning. Last office visit with PCP was 5/3/18. Pending issues:  -- Dr. Rodney Alejandra, Pediatric allergy/immunology following for IgA deficiency  -- Dr. Melida Barbosa, Neuro following for epilepsy    Health Maintenance Due   Topic Date Due    PAP AKA CERVICAL CYTOLOGY  2012    Pneumococcal 19-64 Highest Risk (3 of 3 - PCV13) 2017    Influenza Age 9 to Adult  2018       Rooming Nurse:     -- Offer flu shot per office policy. Document in nursing note if patient declines (document reason) or if clinic is out of stock. If pt reports this season's (Aug 2018-2019) flu shot was administered elsewhere, note both date of admin and clinic/pharmacy that reportedly provided vaccine. -- Release for last Pap smear report         Internal [facility] at [address] [facility], [facility]  Phone [phone]; Fax [phone]    Date of Service:  2018  Patient's Name & : Toni Martin 1990    Assessment/Plan:       Toni Martin was seen today for annual preventive visit. The primary encounter diagnosis was IgA deficiency ([address] Diagnoses of Seizures ([address] S/P placement of VNS (vagus nerve stimulation) device, Mild intermittent asthma without complication, and Migraine without aura and without status migrainosus, not intractable were also pertinent to this visit.   Epilepsy uncontrolled managed by neurology specialist  IgA deficiency and asthma managed by allergy specialist  HM recommendations reviewed with patient. Discussed routine screening labs, had lipids checked (ideal profile) in 2016, will hold off on further labs at this time     Patient Instructions   Yearly follow up for well visit, sooner PRN       No orders of the defined types were placed in this encounter. The patient states understanding of recommended treatment plan. Joshua Ram MD - Internal Medicine  12/24/2018 8:14 AM     Subjective/Discussion:       CHIEF COMPLAINT:  Preventive visit/CPE    Joshua Ram is a 33 y.o. female presenting today for annual preventive visit. Tx refractory epilepsy managed by neuro. Pt reports seizures have been worse, Lamictal levels have been \"all over the place\" in spite of being very consistent with dosing/administration. May be changing therapy this Wednesday at neuro follow up. Interested in [employer]. Has been sick a lot this past year and recovering more slowly from acute illnesses. States Dr. Rachael Cruz is considering starting her on suppressive azithromycin 2-3x/week. Allergist is considering a PCN challenge. Advised to update PPSV23 Q5 years. Nausea had improved when AED dosing was lowered, but has worsened with recent dose increases.        Discussed regular exercise: Y  Discussed healthy diet:  Y  Discussed sun protection:  Y  Discussed always wearing seatbelt in automobile:  Y  Discussed distracted driving, advised not to text while driving:  Y  Change to family history: N    Health Maintenance   Topic Date Due    PAP AKA CERVICAL CYTOLOGY  04/19/2012    Pneumococcal 19-64 Highest Risk (3 of 3 - PCV13) 08/22/2017    Influenza Age 9 to Adult  08/01/2018    DTaP/Tdap/Td series (3 - Td) 10/21/2021     Patient Active Problem List    Diagnosis    S/P placement of VNS (vagus nerve stimulation) device     @[facility] - 1/25/18 - Dr. Eldridge Conception - Left, NEUROSTIMULATOR ELECTRODE AND [address]      Family history of thyroid disease in sister - Hashimoto's    Migraine without aura and without status migrainosus, not intractable    Dizziness     Vertiginous episodes, usually once a month with no trigger, occuring at night, vomits next morning and then better  -- Patient to notify me if worsening symptoms in which case will refer to vestibular rehab  -- Given lab slip for LFTs to be done within 24 hours of next episode, however AST/ALT were normal same day as last episode      Seizures ([facility] [address]     Absence seizures originally, developed tonic-clonic seizures in 2007 12/21/2017: Followed by Dr. Lina De Jesus (neurology)  -- Had VNS placed 1/25/18 @[place] by Dr. Yoel Parra (left side)      IgA deficiency [facility])     Thought to be secondary to Carbatrol  -- Followed by pediatric allergy/immunology through [facility]  -- had appt 7/14 with allergist, IgA levels stable, but low      Asthma     Exercise-induced asthma, may have been from chronic bronchitis  -- Followed by allergist, PFTs have been improving on Advair  4/2/2014:  Taking Advair 100-50 mcg Q12H and PRN albuterol inhaler (Proventil)  -- Does have allergy symptoms, uses cetirizine and Flonase      Acne vulgaris     Currently on 2nd course of Acutane  -- Followed by Dr. Stephie Madera (dermatology)  4/2/2014:  Patient is using 2 forms of birth control, compliant with regulatory program         Review of Systems   Constitutional: Negative for chills, diaphoresis, fever, malaise/fatigue and weight loss. HENT: Negative for congestion, ear discharge, ear pain, hearing loss, nosebleeds, sinus pain, sore throat and tinnitus. Eyes: Negative for blurred vision, double vision, photophobia, pain, discharge and redness. Respiratory: Negative for cough, hemoptysis, sputum production, shortness of breath, wheezing and stridor. Cardiovascular: Negative for chest pain, palpitations, orthopnea, claudication, leg swelling and PND.    Gastrointestinal: Positive for nausea. Negative for abdominal pain, blood in stool, constipation, diarrhea, heartburn, melena and vomiting. Genitourinary: Negative for dysuria, flank pain, frequency, hematuria and urgency. Musculoskeletal: Negative for back pain, falls, joint pain, myalgias and neck pain. Skin: Negative for itching and rash. Neurological: Positive for seizures and headaches. Negative for dizziness, tingling, tremors, sensory change, speech change, focal weakness, loss of consciousness and weakness. Endo/Heme/Allergies: Negative for polydipsia. Does not bruise/bleed easily. Psychiatric/Behavioral: Negative for depression, hallucinations, memory loss, substance abuse and suicidal ideas. The patient is not nervous/anxious and does not have insomnia. Objective:     /68 (BP 1 Location: Right arm, BP Patient Position: Sitting)   Pulse (!) 58   Temp 97.6 °F (36.4 °C) (Oral)   Resp 16   Ht 5' 8\" (1.727 m)   Wt 135 lb (61.2 kg)   LMP 12/16/2018   SpO2 99%   BMI 20.53 kg/m²     Physical Exam   Constitutional: She is oriented to person, place, and time. She appears well-developed and well-nourished. No distress. HENT:   Head: Normocephalic and atraumatic. Right Ear: Tympanic membrane, external ear and ear canal normal.   Left Ear: Tympanic membrane, external ear and ear canal normal.   Nose: Nose normal.   Mouth/Throat: Oropharynx is clear and moist and mucous membranes are normal. No oropharyngeal exudate. Eyes: Conjunctivae, EOM and lids are normal. Pupils are equal, round, and reactive to light. Right eye exhibits no discharge. Left eye exhibits no discharge. No scleral icterus. Neck: Normal range of motion. Neck supple. No JVD present. No thyromegaly present.    Small mildly tender symmetric subcentimeric mid anterior cervical chain lymph nodes   Phonation with occasional hoarseness (pt notes caused by VNS)   Cardiovascular: Normal rate, regular rhythm, normal heart sounds and intact distal pulses. Exam reveals no gallop and no friction rub. No murmur heard. Pulmonary/Chest: Effort normal and breath sounds normal. No stridor. No respiratory distress. She has no wheezes. She has no rales. She exhibits no tenderness. Abdominal: Soft. Bowel sounds are normal. She exhibits no distension and no mass. There is no tenderness. There is no rebound and no guarding. Musculoskeletal: Normal range of motion. She exhibits no edema, tenderness or deformity. Neurological: She is alert and oriented to person, place, and time. She displays no atrophy and no tremor. No cranial nerve deficit. She exhibits normal muscle tone. Coordination and gait normal.   Skin: Skin is warm and dry. No rash noted. She is not diaphoretic. No erythema. No pallor. Psychiatric: She has a normal mood and affect. Her behavior is normal. Judgment and thought content normal.        Laboratory/Testing Results:     No visits with results within 2 Week(s) from this visit.    Latest known visit with results is:   Office Visit on 09/21/2018   Component Date Value Ref Range Status    VALID INTERNAL CONTROL POC 09/21/2018 Yes   Final    Influenza A Ag POC 09/21/2018 Negative  Negative Pos/Neg Final    Influenza B Ag POC 09/21/2018 Negative  Negative Pos/Neg Final       Additional History     Past Medical History:   Diagnosis Date    Acne vulgaris     Currently on 2nd course of Acutane, sees Dr. Ronen Martin Asthma     Exercise-induced asthma, may have been from chronic bronchitis; doing well on Advair     Family history of thyroid disease in sister - Hashimoto's     IgA deficiency ([address]     Initially thought to be secondary to Carbatrol; followed by pediatric allergy/immunology through [facility], has not resolved with med change; every 5 year PPSV23    Migraine without aura and without status migrainosus, not intractable 2/14/2017    Nasal bone fracture 01/02/2018    Seizure 1/2/18, LOC, struck nose on bathroom counter, Dr. Nesha Harding planning closed reduction     Routine adult health maintenance 4/2/2014 4/2/2014:   -- Diabetes mellitus:  [facility] GTT (1 hour) [facility]  -- Vaccinations: Influenza vaccine recommended annually, patient sees immunologist for IgA deficiency -- Weight:  Body mass index is 21.29 kg/(m^2). Discussed the patient's BMI with her. The BMI follow up plan is as follows:  Healthy BMI -- Cervical cancer:    Patient to schedule Pap smear with Dr. Jimmy Rooney in [facility]) 9years old    Absence seizures originally, developed tonic-clonic seizures in 2007; Followed by Dr. Buster Ulrich      Past Surgical History:   Procedure Laterality Date    HX OTHER SURGICAL N/A 01/25/2018    [facility]ICAL      Vagus nerve stimulator     HX WISDOM TEETH EXTRACTION  2009     Social History     Tobacco Use    Smoking status: Never Smoker    Smokeless tobacco: Never Used   Substance Use Topics    Alcohol use: Yes     Comment: 1/wk    Drug use: No     Current Outpatient Medications   Medication Sig    ondansetron (ZOFRAN ODT) 8 mg disintegrating tablet DISSOLVE 1 TABLET ON TONGUE EVERY 12 HOURS AS NEEDED FOR NAUSEA    SUMAtriptan (IMITREX) 20 mg/actuation nasal spray 1 [place] by Both Nostrils route as needed for Migraine.  fluticasone-vilanterol (BREO ELLIPTA) 200-25 mcg/dose inhaler Take 1 Puff by inhalation daily.  albuterol sulfate (PROAIR RESPICLICK) 90 mcg/actuation aepb Take 90 mcg by mouth four (4) times daily.  cholecalciferol (VITAMIN D3) 5,000 unit capsule Take 1 Cap by mouth daily.  zonisamide (ZONEGRAN) 100 mg capsule Take  by mouth.  CETIRIZINE HCL (ALLER-MARSHALL PO) Take  by mouth.  ranitidine (ZANTAC) 75 mg tablet Take 75 mg by mouth two (2) times a day.  calcium-cholecalciferol, d3, (CALCIUM 600 + D) 600-125 mg-unit tab Take  by mouth.  folic acid (FOLVITE) 1 mg tablet Take  by mouth daily.     Lamotrigine (LAMICTAL XR) 200 mg tr24 Take 300 mg by mouth two (2) times a day. No current facility-administered medications for this visit. Allergies   Allergen Reactions    Amoxicillin Hives     Keflex tolerated    Levaquin [Levofloxacin] Seizures     Seizure - lowered seizure threshold    Sulfa (Sulfonamide Antibiotics) Other (comments)     Vision and balance problem while on Vimpat, but had previously tolerated Bactrim well       Encounter Diagnoses:     Encounter Diagnoses     ICD-10-CM ICD-9-CM   1. IgA deficiency ([facility] D80.2 279.01   2. Seizures ([facility] R56.9 780.39   3. S/P placement of VNS (vagus nerve stimulation) device Z96.89 V45.89   4. Mild intermittent asthma without complication Z59.34 824.88   5. Migraine without aura and without status migrainosus, not intractable G43.009 346.10            This document may have been created with the aid of dictation software. Text may contain errors, particularly phonetic errors.

## 2018-12-24 NOTE — PROGRESS NOTES
Cathy Radford is a 33 y.o. female (: 1990) presenting to     Chief Complaint   Patient presents with    Physical       Vitals:    18 1322   BP: 108/68   Pulse: (!) 58   Resp: 16   Temp: 97.6 °F (36.4 °C)   TempSrc: Oral   SpO2: 99%   Weight: 135 lb (61.2 kg)   Height: 5' 8\" (1.727 m)   PainSc:   0 - No pain   LMP: 2018       Hearing/Vision:   No exam data present    Learning Assessment:     Learning Assessment 2015   PRIMARY LEARNER Patient   HIGHEST LEVEL OF EDUCATION - PRIMARY LEARNER  > 4 YEARS OF COLLEGE   BARRIERS PRIMARY LEARNER NONE   PRIMARY LANGUAGE ENGLISH   LEARNER PREFERENCE PRIMARY OTHER (COMMENT)   ANSWERED BY patient   RELATIONSHIP SELF     Depression Screening:     PHQ over the last two weeks 2018   Little interest or pleasure in doing things Not at all   Feeling down, depressed, irritable, or hopeless Not at all   Total Score PHQ 2 0     Fall Risk Assessment:   No flowsheet data found. Abuse Screening:     Abuse Screening Questionnaire 5/3/2018   Do you ever feel afraid of your partner? N   Are you in a relationship with someone who physically or mentally threatens you? N   Is it safe for you to go home? Y     Coordination of Care Questionaire:   1. Have you been to the ER, urgent care clinic since your last visit? Hospitalized since your last visit? insect bite seen at     2. Have you seen or consulted any other health care providers outside of the [facility] since your last visit? Include any pap smears or colon screening. several, notes in epic     Advanced Directive:   1. Do you have an Advanced Directive? NO    2. Would you like information on Advanced Directives? NO    Health Maintenance Due   Topic Date Due    PAP AKA CERVICAL CYTOLOGY  2012    Pneumococcal 19-64 Highest Risk (3 of 3 - PCV13) 2017    Influenza Age 9 to Adult  2018       Already had flu shot.

## 2018-12-28 DIAGNOSIS — R11.2 NON-INTRACTABLE VOMITING WITH NAUSEA, UNSPECIFIED VOMITING TYPE: ICD-10-CM

## 2019-01-03 RX ORDER — ONDANSETRON 8 MG/1
TABLET, ORALLY DISINTEGRATING ORAL
Qty: 20 TAB | Refills: 0 | Status: SHIPPED | OUTPATIENT
Start: 2019-01-03 | End: 2019-09-10 | Stop reason: SDUPTHER

## 2019-02-01 ENCOUNTER — OFFICE VISIT (OUTPATIENT)
Dept: FAMILY MEDICINE CLINIC | Age: 29
End: 2019-02-01

## 2019-02-01 VITALS
WEIGHT: 133 LBS | SYSTOLIC BLOOD PRESSURE: 116 MMHG | DIASTOLIC BLOOD PRESSURE: 76 MMHG | HEART RATE: 92 BPM | BODY MASS INDEX: 20.16 KG/M2 | TEMPERATURE: 98.6 F | RESPIRATION RATE: 16 BRPM | HEIGHT: 68 IN | OXYGEN SATURATION: 100 %

## 2019-02-01 DIAGNOSIS — J06.9 VIRAL UPPER RESPIRATORY TRACT INFECTION: Primary | ICD-10-CM

## 2019-02-01 DIAGNOSIS — J02.9 SORE THROAT: ICD-10-CM

## 2019-02-01 LAB
FLUAV+FLUBV AG NOSE QL IA.RAPID: NEGATIVE POS/NEG
FLUAV+FLUBV AG NOSE QL IA.RAPID: NEGATIVE POS/NEG
S PYO AG THROAT QL: NEGATIVE
VALID INTERNAL CONTROL?: YES
VALID INTERNAL CONTROL?: YES

## 2019-02-01 NOTE — PROGRESS NOTES
Ivan St is a 33 y.o. female (: 1990) presenting to address:sore throat x12 hours     Chief Complaint   Patient presents with    Sore Throat     x12 hours       Vitals:    19 1405   BP: 116/76   Pulse: 92   Resp: 16   Temp: 98.6 °F (37 °C)   TempSrc: Oral   SpO2: 100%   Weight: 133 lb (60.3 kg)   Height: 5' 8\" (1.727 m)   PainSc:   3   PainLoc: Generalized       Hearing/Vision:   No exam data present    Learning Assessment:     Learning Assessment 2015   PRIMARY LEARNER Patient   HIGHEST LEVEL OF EDUCATION - PRIMARY LEARNER  > 4 YEARS OF COLLEGE   BARRIERS PRIMARY LEARNER NONE   PRIMARY LANGUAGE ENGLISH   LEARNER PREFERENCE PRIMARY OTHER (COMMENT)   ANSWERED BY patient   RELATIONSHIP SELF     Depression Screening:     PHQ over the last two weeks 2018   Little interest or pleasure in doing things Not at all   Feeling down, depressed, irritable, or hopeless Not at all   Total Score PHQ 2 0     Fall Risk Assessment:   No flowsheet data found. Abuse Screening:     Abuse Screening Questionnaire 5/3/2018   Do you ever feel afraid of your partner? N   Are you in a relationship with someone who physically or mentally threatens you? N   Is it safe for you to go home? Y     Coordination of Care Questionaire:   1. Have you been to the ER, urgent care clinic since your last visit? Hospitalized since your last visit? no    2. Have you seen or consulted any other health care providers outside of the [address] [place] [address] since your last visit? Include any pap smears or colon screening. no    Advanced Directive:   1. Do you have an Advanced Directive? no    2. Would you like information on Advanced Directives? No    Patient has already received flu vaccine.

## 2019-02-01 NOTE — PATIENT INSTRUCTIONS
Upper Respiratory Infection (Cold): Care Instructions  Your Care Instructions    An upper respiratory infection, or URI, is an infection of the nose, sinuses, or throat. URIs are spread by coughs, sneezes, and direct contact. The common cold is the most frequent kind of URI. The flu and sinus infections are other kinds of URIs. Almost all URIs are caused by viruses. Antibiotics won't cure them. But you can treat most infections with home care. This may include drinking lots of fluids and taking over-the-counter pain medicine. You will probably feel better in 4 to 10 days. The doctor has checked you carefully, but problems can develop later. If you notice any problems or new symptoms, get medical treatment right away. Follow-up care is a key part of your treatment and safety. Be sure to make and go to all appointments, and call your doctor if you are having problems. It's also a good idea to know your test results and keep a list of the medicines you take. How can you care for yourself at home? · To prevent dehydration, drink plenty of fluids, enough so that your urine is light yellow or clear like water. Choose water and other caffeine-free clear liquids until you feel better. If you have kidney, heart, or liver disease and have to limit fluids, talk with your doctor before you increase the amount of fluids you drink. · Take an over-the-counter pain medicine, such as acetaminophen (Tylenol), ibuprofen (Advil, Motrin), or naproxen (Aleve). Read and follow all instructions on the label. · Before you use cough and cold medicines, check the label. These medicines may not be safe for young children or for people with certain health problems. · Be careful when taking over-the-counter cold or flu medicines and Tylenol at the same time. Many of these medicines have acetaminophen, which is Tylenol. Read the labels to make sure that you are not taking more than the recommended dose.  Too much acetaminophen (Tylenol) can be harmful. · Get plenty of rest.  · Do not smoke or allow others to smoke around you. If you need help quitting, talk to your doctor about stop-smoking programs and medicines. These can increase your chances of quitting for good. When should you call for help? Call 911 anytime you think you may need emergency care. For example, call if:    · You have severe trouble breathing.    Call your doctor now or seek immediate medical care if:    · You seem to be getting much sicker.     · You have new or worse trouble breathing.     · You have a new or higher fever.     · You have a new rash.    Watch closely for changes in your health, and be sure to contact your doctor if:    · You have a new symptom, such as a sore throat, an earache, or sinus pain.     · You cough more deeply or more often, especially if you notice more mucus or a change in the color of your mucus.     · You do not get better as expected. Where can you learn more? Go to http://dayna-emery.info/. Enter U067 in the search box to learn more about \"Upper Respiratory Infection (Cold): Care Instructions. \"  Current as of: September 5, 2018  Content Version: 11.9  © 9583-4056 [employer] Care instructions adapted under license by [employer] (which disclaims liability or warranty for this information). If you have questions about a medical condition or this instruction, always ask your healthcare professional. [place] [address] any warranty or liability for your use of this information. Sore Throat: Care Instructions  Your Care Instructions    Infection by bacteria or a virus causes most sore throats. Cigarette smoke, dry air, air pollution, allergies, and yelling can also cause a sore throat. Sore throats can be painful and annoying. Fortunately, most sore throats go away on their own. If you have a bacterial infection, your doctor may prescribe antibiotics.   Follow-up care is a key part of your treatment and safety. Be sure to make and go to all appointments, and call your doctor if you are having problems. It's also a good idea to know your test results and keep a list of the medicines you take. How can you care for yourself at home? · If your doctor prescribed antibiotics, take them as directed. Do not stop taking them just because you feel better. You need to take the full course of antibiotics. · Gargle with warm salt water once an hour to help reduce swelling and relieve discomfort. Use 1 teaspoon of salt mixed in 1 cup of warm water. · Take an over-the-counter pain medicine, such as acetaminophen (Tylenol), ibuprofen (Advil, Motrin), or naproxen (Aleve). Read and follow all instructions on the label. · Be careful when taking over-the-counter cold or flu medicines and Tylenol at the same time. Many of these medicines have acetaminophen, which is Tylenol. Read the labels to make sure that you are not taking more than the recommended dose. Too much acetaminophen (Tylenol) can be harmful. · Drink plenty of fluids. Fluids may help soothe an irritated throat. Hot fluids, such as tea or soup, may help decrease throat pain. · Use over-the-counter throat lozenges to soothe pain. Regular cough drops or hard candy may also help. These should not be given to young children because of the risk of choking. · Do not smoke or allow others to smoke around you. If you need help quitting, talk to your doctor about stop-smoking programs and medicines. These can increase your chances of quitting for good. · Use a vaporizer or humidifier to add moisture to your bedroom. Follow the directions for cleaning the machine. When should you call for help?   Call your doctor now or seek immediate medical care if:    · You have new or worse trouble swallowing.     · Your sore throat gets much worse on one side.    Watch closely for changes in your health, and be sure to contact your doctor if you do not get better as expected. Where can you learn more? Go to http://dayna-emery.info/. Enter 062 [id number] 80 [id number] in the search box to learn more about \"Sore Throat: Care Instructions. \"  Current as of: March 27, 2018  Content Version: 11.9  © 8791-7866 [employer]. Care instructions adapted under license by [employer] (which disclaims liability or warranty for this information). If you have questions about a medical condition or this instruction, always ask your healthcare professional. [address] any warranty or liability for your use of this information.

## 2019-02-01 NOTE — PROGRESS NOTES
Subjective:     Vikas Nobles is a 33 y.o. female who presents for evaluation of symptoms of a URI. Symptoms include sore throat, swollen glands and post nasal drip. Onset of symptoms was 1 day ago, unchanged since that time. She has not taken anything for her symptoms. Past Medical History:   Diagnosis Date    Acne vulgaris     Currently on 2nd course of Acutane, sees Dr. Radha Iglesias Asthma     Exercise-induced asthma, may have been from chronic bronchitis; doing well on Advair     Family history of thyroid disease in sister - Hashimoto's     IgA deficiency ([address]     Initially thought to be secondary to Carbatrol; followed by pediatric allergy/immunology through [facility], has not resolved with med change; every 5 year PPSV23    Migraine without aura and without status migrainosus, not intractable 2/14/2017    Nasal bone fracture 01/02/2018    Seizure 1/2/18, LOC, struck nose on bathroom counter, Dr. Sánchez Moncada closed reduction     Routine adult health maintenance 4/2/2014 4/2/2014:   -- Diabetes mellitus:  Normal GTT (1 hour) in [facility] system  -- Vaccinations: Influenza vaccine recommended annually, patient sees immunologist for IgA deficiency -- Weight:  Body mass index is 21.29 kg/(m^2). Discussed the patient's BMI with her. The BMI follow up plan is as follows:  Healthy BMI -- Cervical cancer:    Patient to schedule Pap smear with Dr. Kate Villa in [place]     Seizures [facility]) 9years old    Absence seizures originally, developed tonic-clonic seizures in 2007; Followed by Dr. Thalia Cooks      Current Outpatient Medications   Medication Sig Dispense Refill    ondansetron (ZOFRAN ODT) 8 mg disintegrating tablet DISSOLVE 1 TABLET ON TONGUE EVERY 12 HOURS AS NEEDED FOR NAUSEA 20 Tab 0    SUMAtriptan (IMITREX) 20 mg/actuation nasal spray 1 [place] by Both Nostrils route as needed for Migraine.  10 Container 11    fluticasone-vilanterol (BREO ELLIPTA) 200-25 mcg/dose inhaler Take 1 Puff by inhalation daily.  albuterol sulfate (PROAIR RESPICLICK) 90 mcg/actuation aepb Take 90 mcg by mouth four (4) times daily.  cholecalciferol (VITAMIN D3) 5,000 unit capsule Take 1 Cap by mouth daily. 90 Cap 3    zonisamide (ZONEGRAN) 100 mg capsule Take  by mouth.  CETIRIZINE HCL (ALLER-MARSHALL PO) Take  by mouth.  ranitidine (ZANTAC) 75 mg tablet Take 75 mg by mouth two (2) times a day.  calcium-cholecalciferol, d3, (CALCIUM 600 + D) 600-125 mg-unit tab Take  by mouth.  folic acid (FOLVITE) 1 mg tablet Take  by mouth daily.  Lamotrigine (LAMICTAL XR) 200 mg tr24 Take 300 mg by mouth two (2) times a day. Allergies   Allergen Reactions    Amoxicillin Hives     Keflex tolerated    Levaquin [Levofloxacin] Seizures     Seizure - lowered seizure threshold    Sulfa (Sulfonamide Antibiotics) Other (comments)     Vision and balance problem while on Vimpat, but had previously tolerated Bactrim well     Social History     Socioeconomic History    Marital status: SINGLE     Spouse name: Not on file    Number of children: Not on file    Years of education: Not on file    Highest education level: Not on file   Social Needs    Financial resource strain: Not on file    Food insecurity - worry: Not on file    Food insecurity - inability: Not on file    Transportation needs - medical: Not on file   [place] needs - non-medical: Not on file   Occupational History    Not on file   Tobacco Use    Smoking status: Never Smoker    Smokeless tobacco: Never Used   Substance and Sexual Activity    Alcohol use: Yes     Comment: 1/wk    Drug use: No    Sexual activity: No   Other Topics Concern    Not on file   Social History Narrative    Teaching pre-school special ed at [employer]     Review of Systems  Pertinent items are noted in HPI.     Objective:     Visit Vitals  /76 (BP 1 Location: Left arm, BP Patient Position: Sitting)   Pulse 92   Temp 98.6 °F (37 °C) (Oral) Resp 16   Ht 5' 8\" (1.727 m)   Wt 133 lb (60.3 kg)   SpO2 100%   BMI 20.22 kg/m²     General:  alert, cooperative, mild distress, appears stated age   Head:  NCAT w/o lesions or tenderness   Eyes: negative   Ears: normal TM's and external ear canals AU   Sinus tender: negative   Mouth:  Lips, mucosa, and tongue normal. Teeth and gums normal   Neck: supple, symmetrical, trachea midline, mild anterior cervical adenopathy, thyroid: not enlarged, symmetric, no tenderness/mass/nodules, no carotid bruit and no JVD. Lungs: clear to auscultation bilaterally   Abdomen: soft, non-tender. Bowel sounds normal. No masses,  no organomegaly        Assessment:     viral upper respiratory illness    Plan:   Discussed dx and tx of URIs  Discussed the importance of avoiding unnecessary abx therapy. Suggested symptomatic OTC remedies.    Gargle salt water   rtc as needed

## 2019-02-06 ENCOUNTER — OFFICE VISIT (OUTPATIENT)
Dept: FAMILY MEDICINE CLINIC | Age: 29
End: 2019-02-06

## 2019-02-06 VITALS
RESPIRATION RATE: 16 BRPM | TEMPERATURE: 97.2 F | BODY MASS INDEX: 19.97 KG/M2 | SYSTOLIC BLOOD PRESSURE: 103 MMHG | HEIGHT: 68 IN | OXYGEN SATURATION: 100 % | DIASTOLIC BLOOD PRESSURE: 60 MMHG | HEART RATE: 99 BPM | WEIGHT: 131.8 LBS

## 2019-02-06 DIAGNOSIS — J01.00 ACUTE NON-RECURRENT MAXILLARY SINUSITIS: Primary | ICD-10-CM

## 2019-02-06 RX ORDER — FLUTICASONE PROPIONATE 50 MCG
2 SPRAY, SUSPENSION (ML) NASAL DAILY
COMMUNITY

## 2019-02-06 RX ORDER — DOXYCYCLINE 100 MG/1
100 CAPSULE ORAL 2 TIMES DAILY
Qty: 20 CAP | Refills: 0 | Status: SHIPPED | OUTPATIENT
Start: 2019-02-06 | End: 2019-02-16

## 2019-02-06 NOTE — PROGRESS NOTES
09/06/17        Miracle Carrizales  [address] [facility]      Dear Tammie Nova,    [address] records indicate that you have outstanding lab work and or testing that was ordered for you and has not yet been completed:               Hepatic Function Pan Yanni Rendon is a 33 y.o. female (: 1990) presenting to address: Chief Complaint Patient presents with  Sore Throat Vitals:  
 19 1400 BP: 103/60 Pulse: 99 Resp: 16 Temp: 97.2 °F (36.2 °C) TempSrc: Oral  
SpO2: 100% Weight: 131 lb 12.8 oz (59.8 kg) Height: 5' 8\" (1.727 m) PainSc:   3 PainLoc: Throat LMP: 01/10/2019 Hearing/Vision: No exam data present Learning Assessment:  
 
Learning Assessment 2015 PRIMARY LEARNER Patient HIGHEST LEVEL OF EDUCATION - PRIMARY LEARNER  > 4 YEARS OF COLLEGE  
BARRIERS PRIMARY LEARNER NONE PRIMARY LANGUAGE ENGLISH  
LEARNER PREFERENCE PRIMARY OTHER (COMMENT) ANSWERED BY patient RELATIONSHIP SELF Depression Screening: PHQ over the last two weeks 2019 Little interest or pleasure in doing things Not at all Feeling down, depressed, irritable, or hopeless Not at all Total Score PHQ 2 0 Fall Risk Assessment:  
No flowsheet data found. Abuse Screening:  
 
Abuse Screening Questionnaire 5/3/2018 Do you ever feel afraid of your partner? [employer] Are you in a relationship with someone who physically or mentally threatens you? RonakNomesia Is it safe for you to go home? [facility] Coordination of Care Questionaire: 1. Have you been to the ER, urgent care clinic since your last visit? Hospitalized since your last visit? NO 
 
2. Have you seen or consulted any other health care providers outside of the [address] [place] [address] since your last visit? Include any pap smears or colon screening. NO Advanced Directive: 1. Do you have an Advanced Directive? NO 
 
2. Would you like information on Advanced Directives?  NO

## 2019-02-06 NOTE — PATIENT INSTRUCTIONS
Saline Nasal Washes: Care Instructions Your Care Instructions Saline nasal washes help keep the nasal passages open by washing out thick or dried mucus. This simple remedy can help relieve symptoms of allergies, sinusitis, and colds. It also can make the nose feel more comfortable by keeping the mucous membranes moist. You may notice a little burning sensation in your nose the first few times you use the solution, but this usually gets better in a few days. Follow-up care is a key part of your treatment and safety. Be sure to make and go to all appointments, and call your doctor if you are having problems. It's also a good idea to know your test results and keep a list of the medicines you take. How can you care for yourself at home? · You can buy premixed saline solution in a squeeze bottle or other sinus rinse products at a drugstore. Read and follow the instructions on the label. · You also can make your own saline solution by adding 1 teaspoon of salt and 1 teaspoon of baking soda to 2 cups of distilled water. · If you use a homemade solution, pour a small amount into a clean bowl. Using a rubber bulb syringe, squeeze the syringe and place the tip in the salt water. Pull a small amount of the salt water into the syringe by relaxing your hand. · Sit down with your head tilted slightly back. Do not lie down. Put the tip of the bulb syringe or the squeeze bottle a little way into one of your nostrils. Gently drip or squirt a few drops into the nostril. Repeat with the other nostril. Some sneezing and gagging are normal at first. 
· Gently blow your nose. · Wipe the syringe or bottle tip clean after each use. · Repeat this 2 or 3 times a day. · Use nasal washes gently if you have nosebleeds often. When should you call for help? Watch closely for changes in your health, and be sure to contact your doctor if: 
  · You often get nosebleeds.  
  · You have problems doing the nasal washes. Where can you learn more? Go to http://dayna-emery.info/. Enter [id number] [phone] in the search box to learn more about \"Saline Nasal Washes: Care Instructions. \" Current as of: March 27, 2018 Content Version: 11.9 © 8311-9208 [employer]. Care instructions adapted under license by [employer] (which disclaims liability or warranty for this information). If you have questions about a medical condition or this instruction, always ask your healthcare professional. [address] any warranty or liability for your use of this information. Sinusitis: Care Instructions Your Care Instructions Sinusitis is an infection of the lining of the sinus cavities in your head. Sinusitis often follows a cold. It causes pain and pressure in your head and face. In most cases, sinusitis gets better on its own in 1 to 2 weeks. But some mild symptoms may last for several weeks. Sometimes antibiotics are needed. Follow-up care is a key part of your treatment and safety. Be sure to make and go to all appointments, and call your doctor if you are having problems. It's also a good idea to know your test results and keep a list of the medicines you take. How can you care for yourself at home? · Take an over-the-counter pain medicine, such as acetaminophen (Tylenol), ibuprofen (Advil, Motrin), or naproxen (Aleve). Read and follow all instructions on the label. · If the doctor prescribed antibiotics, take them as directed. Do not stop taking them just because you feel better. You need to take the full course of antibiotics. · Be careful when taking over-the-counter cold or flu medicines and Tylenol at the same time. Many of these medicines have acetaminophen, which is Tylenol. Read the labels to make sure that you are not taking more than the recommended dose. Too much acetaminophen (Tylenol) can be harmful. · Breathe warm, moist air from a steamy shower, a hot bath, or a sink filled with hot water. Avoid cold, dry air. Using a humidifier in your home may help. Follow the directions for cleaning the machine. · Use saline (saltwater) nasal washes to help keep your nasal passages open and wash out mucus and bacteria. You can buy saline nose drops at a grocery store or drugstore. Or you can make your own at home by adding 1 teaspoon of salt and 1 teaspoon of baking soda to 2 cups of distilled water. If you make your own, fill a bulb syringe with the solution, insert the tip into your nostril, and squeeze gently. [employer] your nose. · Put a hot, wet towel or a warm gel pack on your face 3 or 4 times a day for 5 to 10 minutes each time. · Try a decongestant nasal spray like oxymetazoline (Afrin). Do not use it for more than 3 days in a row. Using it for more than 3 days can make your congestion worse. When should you call for help? Call your doctor now or seek immediate medical care if: 
  · You have new or worse swelling or redness in your face or around your eyes.  
  · You have a new or higher fever.  
 Watch closely for changes in your health, and be sure to contact your doctor if: 
  · You have new or worse facial pain.  
  · The mucus from your nose becomes thicker (like pus) or has new blood in it.  
  · You are not getting better as expected. Where can you learn more? Go to http://dayna-emery.info/. Enter Y240 in the search box to learn more about \"Sinusitis: Care Instructions. \" Current as of: March 27, 2018 Content Version: 11.9 © 9043-1792 [employer]. Care instructions adapted under license by [employer] (which disclaims liability or warranty for this information).  If you have questions about a medical condition or this instruction, always ask your healthcare professional. [address] any warranty or liability for your use of this information.

## 2019-02-06 NOTE — PROGRESS NOTES
HISTORY OF PRESENT ILLNESS Ignacio  is a 33 y.o. female. HPI 
C/o sore throat/facial congestion/pain and pressure, started over a week ago, seen last week and had negative strep and flu tests, pt stated that her sore throat improved but her facial pain/pressure/and postnasal drip is worse, associated with mild dry cough Pt has Asthma, denies wheezing or sob Pt has IGA deficiency Review of Systems Constitutional: Negative for chills and fever. HENT: Positive for congestion and sinus pain. Negative for sore throat. Respiratory: Positive for cough. Negative for shortness of breath and wheezing. Cardiovascular: Negative for chest pain. Physical Exam  
HENT:  
Right Ear: Tympanic membrane normal.  
Left Ear: Tympanic membrane normal.  
Nose: Mucosal edema present. Right sinus exhibits maxillary sinus tenderness. Left sinus exhibits maxillary sinus tenderness. Mouth/Throat: Posterior oropharyngeal erythema present. No oropharyngeal exudate. Cardiovascular: Normal rate, regular rhythm and normal heart sounds. Pulmonary/Chest: Effort normal and breath sounds normal. She has no wheezes. Lymphadenopathy:  
  She has no cervical adenopathy. Vitals reviewed. ASSESSMENT and PLAN Diagnoses and all orders for this visit: 
 
1. Acute non-recurrent maxillary sinusitis 
-     doxycycline (VIBRAMYCIN) 100 mg capsule; Take 1 Cap by mouth two (2) times a day for 10 days. flonase, NS, 2 sprays each nostril daily 
 
rtc if not better

## 2019-05-07 ENCOUNTER — TELEPHONE (OUTPATIENT)
Dept: FAMILY MEDICINE CLINIC | Age: 29
End: 2019-05-07

## 2019-05-07 NOTE — TELEPHONE ENCOUNTER
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 She is wondering if Dr. Shane Alicea could just fill this paperwork out or if she needs to come in to be seen. I told her usually Dr. Shane Alicea requires a 30 minute appointment for paperwork but she was hoping since she has been completing this in the past that she can fill this out without an appointment. I told her either way we would give her a call.

## 2019-05-20 NOTE — TELEPHONE ENCOUNTER
Patient stated she will drop forms off and will attached days missed and how frequent she has appointments during the week.

## 2019-09-10 DIAGNOSIS — R11.2 NON-INTRACTABLE VOMITING WITH NAUSEA, UNSPECIFIED VOMITING TYPE: ICD-10-CM

## 2019-09-12 RX ORDER — ONDANSETRON 8 MG/1
TABLET, ORALLY DISINTEGRATING ORAL
Qty: 20 TAB | Refills: 0 | Status: SHIPPED | OUTPATIENT
Start: 2019-09-12

## 2019-09-12 NOTE — TELEPHONE ENCOUNTER
Last seen: 2/6/19 w/Dr. Kady Cordova  Last filled: 1/3/19; qty 20 w/no refills; dissolve 1 tab on tongue every 12 hours PRN nausea

## 2019-12-19 ENCOUNTER — OFFICE VISIT (OUTPATIENT)
Dept: FAMILY MEDICINE CLINIC | Age: 29
End: 2019-12-19

## 2019-12-19 VITALS
BODY MASS INDEX: 22.73 KG/M2 | TEMPERATURE: 98 F | RESPIRATION RATE: 16 BRPM | WEIGHT: 150 LBS | HEART RATE: 67 BPM | DIASTOLIC BLOOD PRESSURE: 75 MMHG | SYSTOLIC BLOOD PRESSURE: 114 MMHG | HEIGHT: 68 IN | OXYGEN SATURATION: 100 %

## 2019-12-19 DIAGNOSIS — Z00.00 ANNUAL PHYSICAL EXAM: Primary | ICD-10-CM

## 2019-12-19 DIAGNOSIS — J06.9 UPPER RESPIRATORY TRACT INFECTION, UNSPECIFIED TYPE: ICD-10-CM

## 2019-12-19 PROBLEM — K76.89 FOCAL NODULAR HYPERPLASIA OF LIVER: Status: ACTIVE | Noted: 2018-01-01

## 2019-12-19 RX ORDER — OXCARBAZEPINE 600 MG/1
TABLET, FILM COATED ORAL 2 TIMES DAILY
COMMUNITY

## 2019-12-19 RX ORDER — AZITHROMYCIN 500 MG/1
500 TABLET, FILM COATED ORAL DAILY
Qty: 7 TAB | Refills: 0 | Status: SHIPPED | OUTPATIENT
Start: 2019-12-19 | End: 2019-12-26

## 2019-12-19 RX ORDER — PERAMPANEL 6 MG/1
6 TABLET ORAL DAILY
COMMUNITY
Start: 2019-12-13 | End: 2019-12-19 | Stop reason: ALTCHOICE

## 2019-12-19 NOTE — PATIENT INSTRUCTIONS
Well Visit, Ages 25 to 48: Care Instructions Your Care Instructions Physical exams can help you stay healthy. Your doctor has checked your overall health and may have suggested ways to take good care of yourself. He or she also may have recommended tests. At home, you can help prevent illness with healthy eating, regular exercise, and other steps. Follow-up care is a key part of your treatment and safety. Be sure to make and go to all appointments, and call your doctor if you are having problems. It's also a good idea to know your test results and keep a list of the medicines you take. How can you care for yourself at home? · Reach and stay at a healthy weight. This will lower your risk for many problems, such as obesity, diabetes, heart disease, and high blood pressure. · Get at least 30 minutes of physical activity on most days of the week. Walking is a good choice. You also may want to do other activities, such as running, swimming, cycling, or playing tennis or team sports. Discuss any changes in your exercise program with your doctor. · Do not smoke or allow others to smoke around you. If you need help quitting, talk to your doctor about stop-smoking programs and medicines. These can increase your chances of quitting for good. · Talk to your doctor about whether you have any risk factors for sexually transmitted infections (STIs). Having one sex partner (who does not have STIs and does not have sex with anyone else) is a good way to avoid these infections. · Use birth control if you do not want to have children at this time. Talk with your doctor about the choices available and what might be best for you. · Protect your skin from too much sun. When you're outdoors from 10 a.m. to 4 p.m., stay in the shade or cover up with clothing and a hat with a wide brim. Wear sunglasses that block UV rays. Even when it's cloudy, put broad-spectrum sunscreen (SPF 30 or higher) on any exposed skin. · See a dentist one or two times a year for checkups and to have your teeth cleaned. · Wear a seat belt in the car. Follow your doctor's advice about when to have certain tests. These tests can spot problems early. For everyone · Cholesterol. Have the fat (cholesterol) in your blood tested after age 21. Your doctor will tell you how often to have this done based on your age, family history, or other things that can increase your risk for heart disease. · Blood pressure. Have your blood pressure checked during a routine doctor visit. Your doctor will tell you how often to check your blood pressure based on your age, your blood pressure results, and other factors. · Vision. Talk with your doctor about how often to have a glaucoma test. 
· Diabetes. Ask your doctor whether you should have tests for diabetes. · Colon cancer. Your risk for colorectal cancer gets higher as you get older. Some experts say that adults should start regular screening at age 48 and stop at age 76. Others say to start before age 48 or continue after age 76. Talk with your doctor about your risk and when to start and stop screening. For women · Breast exam and mammogram. Talk to your doctor about when you should have a clinical breast exam and a mammogram. Medical experts differ on whether and how often women under 50 should have these tests. Your doctor can help you decide what is right for you. · Cervical cancer screening test and pelvic exam. Begin with a Pap test at age 24. The test often is part of a pelvic exam. Starting at age [address] [address], you may choose to have a Pap test, an HPV test, or both tests at the same time (called co-testing). Talk with your doctor about how often to have testing. · Tests for sexually transmitted infections (STIs). Ask whether you should have tests for STIs. You may be at risk if you have sex with more than one person, especially if your partners do not wear condoms. For men · Tests for sexually transmitted infections (STIs). Ask whether you should have tests for STIs. You may be at risk if you have sex with more than one person, especially if you do not wear a condom. · Testicular cancer exam. Ask your doctor whether you should check your testicles regularly. · Prostate exam. Talk to your doctor about whether you should have a blood test (called a PSA test) for prostate cancer. Experts differ on whether and when men should have this test. Some experts suggest it if you are older than 39 and are -American or have a father or brother who got prostate cancer when he was younger than 72. When should you call for help? Watch closely for changes in your health, and be sure to contact your doctor if you have any problems or symptoms that concern you. Where can you learn more? Go to http://dayna-emery.info/. Enter P072 in the search box to learn more about \"Well Visit, Ages 25 to 48: Care Instructions. \" Current as of: December 13, 2018 Content Version: 12.2 © 4513-7804 [employer]. Care instructions adapted under license by [employer] (which disclaims liability or warranty for this information). If you have questions about a medical condition or this instruction, always ask your healthcare professional. [address] any warranty or liability for your use of this information.

## 2019-12-19 NOTE — PROGRESS NOTES
Love Reyes is a 30 y.o. female (: 1990) presenting to address:    Chief Complaint   Patient presents with    Complete Physical       Vitals:    19 0740   BP: 114/75   Pulse: 67   Resp: 16   Temp: 98 °F (36.7 °C)   TempSrc: Oral   SpO2: 100%   Weight: 150 lb (68 kg)   Height: 5' 8\" (1.727 m)   PainSc:   0 - No pain   LMP: 2019       Hearing/Vision:   No exam data present    Learning Assessment:     Learning Assessment 2015   PRIMARY LEARNER Patient   HIGHEST LEVEL OF EDUCATION - PRIMARY LEARNER  > 4 YEARS OF COLLEGE   BARRIERS PRIMARY LEARNER NONE   PRIMARY LANGUAGE ENGLISH   LEARNER PREFERENCE PRIMARY OTHER (COMMENT)   ANSWERED BY patient   RELATIONSHIP SELF     Depression Screening:     3 most recent PHQ Screens 2019   Little interest or pleasure in doing things Not at all   Feeling down, depressed, irritable, or hopeless Not at all   Total Score PHQ 2 0     Fall Risk Assessment:   No flowsheet data found. Abuse Screening:     Abuse Screening Questionnaire 5/3/2018   Do you ever feel afraid of your partner? N   Are you in a relationship with someone who physically or mentally threatens you? N   Is it safe for you to go home? Y     Coordination of Care Questionaire:   1. Have you been to the ER, urgent care clinic since your last visit? Hospitalized since your last visit? NO    2. Have you seen or consulted any other health care providers outside of the [address] [place] [address] since your last visit? Include any pap smears or colon screening. NO    Advanced Directive:   1. Do you have an Advanced Directive? NO    2. Would you like information on Advanced Directives?  NO

## 2019-12-19 NOTE — PROGRESS NOTES
SUBJECTIVE:   30 y.o. female for annual routine checkup. Has been dealing with a cold since prior to Thanksgiving. Has IgA deficiency so she will typically wait and watch a respiratory illness, will only come in when she is not able to improve. Has been taking Mucinex but still has a cough, productive with yellow sputum, hoarseness. Current Outpatient Medications   Medication Sig Dispense Refill    OXcarbazepine (TRILEPTAL) 600 mg tablet Take  by mouth two (2) times a day.  azithromycin (ZITHROMAX) 500 mg tab Take 1 Tab by mouth daily for 7 days. 7 Tab 0    ondansetron (ZOFRAN ODT) 8 mg disintegrating tablet DISSOLVE 1 TABLET ON TONGUE EVERY 12 HOURS AS NEEDED FOR NAUSEA 20 Tab 0    SUMAtriptan (IMITREX) 20 mg/actuation nasal spray INSTILL 1 SPRAY INTO BOTH NOSTRILS AS NEEDED FOR MIGRAINE 6 Container 5    fluticasone (FLONASE ALLERGY RELIEF) 50 mcg/actuation nasal spray 2 Sprays by Both Nostrils route daily.  fluticasone-vilanterol (BREO ELLIPTA) 200-25 mcg/dose inhaler Take 1 Puff by inhalation daily.  albuterol sulfate (PROAIR RESPICLICK) 90 mcg/actuation aepb Take 90 mcg by mouth four (4) times daily.  cholecalciferol (VITAMIN D3) 5,000 unit capsule Take 1 Cap by mouth daily. 90 Cap 3    CETIRIZINE HCL (ALLER-MARSHALL PO) Take  by mouth.  ranitidine (ZANTAC) 75 mg tablet Take 150 mg by mouth daily.  calcium-cholecalciferol, d3, (CALCIUM 600 + D) 600-125 mg-unit tab Take  by mouth.  folic acid (FOLVITE) 1 mg tablet Take  by mouth daily.          Past Medical History:   Diagnosis Date    Acne vulgaris     Currently on 2nd course of Acutane, sees Dr. Komal Bland Asthma     Exercise-induced asthma, may have been from chronic bronchitis; doing well on Advair     Family history of thyroid disease in sister - Hashimoto's     Focal nodular hyperplasia of liver 2018    IgA deficiency ([address]     Initially thought to be secondary to Carbatrol; followed by pediatric allergy/immunology through [facility], has not resolved with med change; every 5 year PPSV23    Migraine without aura and without status migrainosus, not intractable 2/14/2017    Nasal bone fracture 01/02/2018    Seizure 1/2/18, LOC, struck nose on bathroom counter, Dr. Ivone Roman planning closed reduction     Routine adult health maintenance 4/2/2014 4/2/2014:   -- Diabetes mellitus:  Normal GTT (1 hour) [facility]  -- Vaccinations: Influenza vaccine recommended annually, patient sees immunologist for IgA deficiency -- Weight:  Body mass index is 21.29 kg/(m^2). Discussed the patient's BMI with her. The BMI follow up plan is as follows:  Healthy BMI -- Cervical cancer:    Patient to schedule Pap smear with Dr. Valarie Ramírez in [place]     Seizures [facility]) 9years old    Absence seizures originally, developed tonic-clonic seizures in 2007; Followed by Dr. Jeremi Moreau: Amoxicillin; Levaquin [levofloxacin]; and Sulfa (sulfonamide antibiotics)   Patient's last menstrual period was 12/08/2019. ROS:  Feeling well. No dyspnea or chest pain on exertion. No abdominal pain, change in bowel habits, black or bloody stools. No urinary tract symptoms. GYN ROS: no breast pain or new or enlarging lumps on self exam. No neurological complaints. OBJECTIVE:   The patient appears well, alert, oriented x 3, in no distress.     Visit Vitals  /75   Pulse 67   Temp 98 °F (36.7 °C) (Oral)   Resp 16   Ht 5' 8\" (1.727 m)   Wt 150 lb (68 kg)   LMP 12/08/2019   SpO2 100%   BMI 22.81 kg/m²       General appearance: alert, cooperative, no distress, appears stated age  Head: Normocephalic, without obvious abnormality, atraumatic  Ears: normal TM's and external ear canals AU  Throat: Lips, mucosa, and tongue normal. Teeth and gums normal  Neck: supple, symmetrical, trachea midline, no adenopathy, thyroid: not enlarged, symmetric, no tenderness/mass/nodules, no carotid bruit and no JVD  Back: symmetric, no curvature. ROM normal. No CVA tenderness. Lungs: clear to auscultation bilaterally  Breasts: patient declines to have breast exam.  Heart: regular rate and rhythm, S1, S2 normal, no murmur, click, rub or gallop  Abdomen: soft, non-tender. Bowel sounds normal. No masses,  no organomegaly  Extremities: extremities normal, atraumatic, no cyanosis or edema  Pulses: 2+ and symmetric  Skin: Skin color, texture, turgor normal. No rashes or lesions  Neurological is normal, no focal findings. Lab Results   Component Value Date/Time    WBC 9.2 02/14/2017 10:01 AM    HGB 14.2 02/14/2017 10:01 AM    HCT 41.6 02/14/2017 10:01 AM    PLATELET 398 63/36/6640 10:01 AM    MCV 98 (H) 02/14/2017 10:01 AM         Lab Results   Component Value Date/Time    Sodium 143 02/14/2017 10:01 AM    Potassium 5.0 02/14/2017 10:01 AM    Chloride 101 02/14/2017 10:01 AM    CO2 25 02/14/2017 10:01 AM    Anion gap 17.0 02/14/2017 10:01 AM    Glucose 89 02/14/2017 10:01 AM    BUN 10 02/14/2017 10:01 AM    Creatinine 0.9 05/04/2018 07:30 AM    Calcium 9.9 02/14/2017 10:01 AM         Lab Results   Component Value Date/Time    ALT (SGPT) 21 05/04/2018 07:30 AM    AST (SGOT) 20 05/04/2018 07:30 AM    Alk. phosphatase 82 02/14/2017 10:01 AM    Bilirubin, direct <0.2 10/02/2014 02:40 PM    Bilirubin, total 0.4 02/14/2017 10:01 AM         Lab Results   Component Value Date/Time    Cholesterol, total 154 12/02/2016 07:18 AM    HDL Cholesterol 65 (H) 12/02/2016 07:18 AM    LDL, calculated 82 12/02/2016 07:18 AM    VLDL, calculated 7 (L) 12/02/2016 07:18 AM    Triglyceride 35 (L) 12/02/2016 07:18 AM         Lab Results   Component Value Date/Time    TSH 1.13 02/14/2017 10:01 AM    T4, Free 1.0 05/04/2018 07:30 AM         Lab Results   Component Value Date/Time    VITAMIN D, 25-HYDROXY 30.9 (L) 12/02/2016 07:18 AM             ASSESSMENT:   Diagnoses and all orders for this visit:    1. Annual physical exam    2.  Upper respiratory tract infection, unspecified type    Other orders  -     azithromycin (ZITHROMAX) 500 mg tab; Take 1 Tab by mouth daily for 7 days. Her immunologist is planning to refer her over to see ENT for her hx with chronic sinusitis. PLAN:     Pap: with her Gyn. The plan was discussed with the patient. The patient verbalized understanding and is in agreement with the plan. All medication potential side effects were discussed with the patient.

## 2020-02-15 NOTE — TELEPHONE ENCOUNTER
Reynalitzynoam Finnegan walked in to office requesting labs ordered by neurologist.  Lamictal level today and again on March 16th.       Orders Placed This Encounter    LAMOTRIGINE (LAMICTAL)     Trough level (morning draw) - CC results to Dr. Kay Price     Standing Status:   Future     Standing Expiration Date:   3/3/2018    LAMOTRIGINE (LAMICTAL)     Lamictal level 3/16/17 - CC results to Dr. Kay Price     Standing Status:   Future     Standing Expiration Date:   3/3/2018     Scheduling Instructions:      Draw on 3/16/17 High grade SBO

## 2020-03-10 ENCOUNTER — OFFICE VISIT (OUTPATIENT)
Dept: FAMILY MEDICINE CLINIC | Age: 30
End: 2020-03-10

## 2020-03-10 VITALS
BODY MASS INDEX: 24.25 KG/M2 | WEIGHT: 160 LBS | SYSTOLIC BLOOD PRESSURE: 119 MMHG | HEART RATE: 78 BPM | RESPIRATION RATE: 20 BRPM | HEIGHT: 68 IN | OXYGEN SATURATION: 100 % | DIASTOLIC BLOOD PRESSURE: 71 MMHG | TEMPERATURE: 98.1 F

## 2020-03-10 DIAGNOSIS — J06.9 ACUTE URI: Primary | ICD-10-CM

## 2020-03-10 RX ORDER — AZITHROMYCIN 500 MG/1
500 TABLET, FILM COATED ORAL DAILY
Qty: 7 TAB | Refills: 0 | Status: SHIPPED | OUTPATIENT
Start: 2020-03-10 | End: 2020-03-17

## 2020-03-10 NOTE — PROGRESS NOTES
HISTORY OF PRESENT ILLNESS  Conner Royal is a 30 y.o. female. HPI  C/o started 2-3 weeks ago with congestion/sore throat and mild dry cough, not better with OTC meds  Pt has IGA deficiency and Asthma  Review of Systems   Constitutional: Negative for chills and fever. HENT: Positive for congestion. Negative for ear pain. Respiratory: Negative for shortness of breath and wheezing. Physical Exam  Vitals signs reviewed. HENT:      Right Ear: Tympanic membrane normal.      Left Ear: Tympanic membrane normal.      Nose: Congestion present. Right Sinus: No maxillary sinus tenderness. Left Sinus: No maxillary sinus tenderness. Mouth/Throat:      Pharynx: Posterior oropharyngeal erythema present. No oropharyngeal exudate. Cardiovascular:      Rate and Rhythm: Normal rate and regular rhythm. Pulmonary:      Effort: Pulmonary effort is normal.      Breath sounds: Normal breath sounds. No wheezing. ASSESSMENT and PLAN  Diagnoses and all orders for this visit:    1. Acute URI  -     azithromycin (ZITHROMAX) 500 mg tab; Take 1 Tab by mouth daily for 7 days.   Sudafed otc prn for congestion

## 2020-03-10 NOTE — PROGRESS NOTES
Amparo Esquivel is a 30 y.o. female (: 1990) presenting to address:    Chief Complaint   Patient presents with    Cold Symptoms       Vitals:    03/10/20 1442   BP: 119/71   Pulse: 78   Resp: 20   Temp: 98.1 °F (36.7 °C)   TempSrc: Oral   SpO2: 100%   Weight: 160 lb (72.6 kg)   Height: 5' 8\" (1.727 m)   PainSc:   0 - No pain   LMP: 2020       Learning Assessment:     Learning Assessment 2015   PRIMARY LEARNER Patient   HIGHEST LEVEL OF EDUCATION - PRIMARY LEARNER  > 4 YEARS OF COLLEGE   BARRIERS PRIMARY LEARNER NONE   PRIMARY LANGUAGE ENGLISH   LEARNER PREFERENCE PRIMARY OTHER (COMMENT)   ANSWERED BY patient   RELATIONSHIP SELF     Depression Screening:     3 most recent PHQ Screens 2019   Little interest or pleasure in doing things Not at all   Feeling down, depressed, irritable, or hopeless Not at all   Total Score PHQ 2 0     Fall Risk Assessment:     Fall Risk Assessment, last 12 mths 3/10/2020   Able to walk? Yes   Fall in past 12 months? No     Abuse Screening:     Abuse Screening Questionnaire 3/10/2020   Do you ever feel afraid of your partner? N   Are you in a relationship with someone who physically or mentally threatens you? N   Is it safe for you to go home? Y     Coordination of Care Questionaire:   1. Have you been to the ER, urgent care clinic since your last visit? Hospitalized since your last visit? YES- [facility]    2. Have you seen or consulted any other health care providers outside of the [address] [place] [address] since your last visit? Include any pap smears or colon screening. YES- neurology, GI, dermatology    Advanced Directive:   1. Do you have an Advanced Directive? YES    2. Would you like information on Advanced Directives?  NO

## 2020-09-18 LAB — SARS-COV-2, NAA: NOT DETECTED

## 2020-09-24 ENCOUNTER — TELEPHONE (OUTPATIENT)
Dept: FAMILY MEDICINE CLINIC | Age: 30
End: 2020-09-24

## 2020-09-24 DIAGNOSIS — Z00.00 ANNUAL PHYSICAL EXAM: Primary | ICD-10-CM

## 2020-09-24 NOTE — TELEPHONE ENCOUNTER
Patient called to schedule physical and lab appointment. Future Appointments   Date Time Provider Gera Wheeleri   10/16/2020  8:40 AM LAB_[facility]   10/22/2020  8:30 AM Leigh Ann Guardado MD [facility]     Patient needs lab orders placed for her.

## 2020-09-30 ENCOUNTER — VIRTUAL VISIT (OUTPATIENT)
Dept: FAMILY MEDICINE CLINIC | Age: 30
End: 2020-09-30

## 2020-09-30 DIAGNOSIS — D80.2 IGA DEFICIENCY (HCC): Primary | ICD-10-CM

## 2020-09-30 DIAGNOSIS — R56.9 SEIZURES (HCC): ICD-10-CM

## 2020-09-30 DIAGNOSIS — Z87.09 HISTORY OF FREQUENT URI: ICD-10-CM

## 2020-09-30 DIAGNOSIS — R11.2 NON-INTRACTABLE VOMITING WITH NAUSEA, UNSPECIFIED VOMITING TYPE: ICD-10-CM

## 2020-09-30 PROBLEM — R11.10 NON-INTRACTABLE VOMITING: Status: ACTIVE | Noted: 2020-09-30

## 2020-09-30 PROCEDURE — 99213 OFFICE O/P EST LOW 20 MIN: CPT | Performed by: INTERNAL MEDICINE

## 2020-09-30 NOTE — PROGRESS NOTES
Kevin Mckinney is a 35 y.o. female who was seen by synchronous (real-time) audio-video technology on 9/30/2020 for Epilepsy        Assessment & Plan:   Diagnoses and all orders for this visit:    1. IgA deficiency ([facility] [address]    2. Seizures ([facility] [address]    3. History of frequent URI    4. Non-intractable vomiting with nausea, unspecified vomiting type      FMLA form completed during the visit. Pt will pick this up next week. 712  Subjective:   Pt seen today for f/u. Has a prolonged hx of GI issues, particularly related to recurrent nausea and vomiting, she also has hx of epilepsy with her last seizure occurring in July. Has IgA deficiency which causes her to be prone to frequent URIs. 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 As a result, she needs to keep an active FMLA form on file. Prior to Admission medications    Medication Sig Start Date End Date Taking? Authorizing Provider   OXcarbazepine (OXTELLAR XR) 600 mg Tb24 Take  by mouth. Provider, Historical   perampaneL (FYCOMPA) 6 mg tab tablet Take  by mouth nightly. Provider, Historical   immun glob G,IgG,-gly-IgA ov50 (CUVITRU) 8 gram/40 mL (20 %) soln by SubCUTAneous route. Provider, Historical   OXcarbazepine (TRILEPTAL) 600 mg tablet Take  by mouth two (2) times a day. Provider, Historical   ondansetron (ZOFRAN ODT) 8 mg disintegrating tablet DISSOLVE 1 TABLET ON TONGUE EVERY 12 HOURS AS NEEDED FOR NAUSEA 9/12/19   Honey Puente MD   SUMAtriptan (IMITREX) 20 mg/actuation nasal spray INSTILL 1 SPRAY INTO BOTH NOSTRILS AS NEEDED FOR MIGRAINE 6/12/19   Mitchell Kemp MD   fluticasone [facility] ALLERGY RELIEF) 50 mcg/actuation nasal spray 2 Sprays by Both Nostrils route daily. Provider, Historical   fluticasone-vilanterol (BREO ELLIPTA) 200-25 mcg/dose inhaler Take 1 Puff by inhalation daily.     Provider, Historical   albuterol sulfate (PROAIR RESPICLICK) 90 mcg/actuation aepb Take 90 mcg by mouth four (4) times daily. 5/4/17   Provider, Historical   cholecalciferol (VITAMIN D3) 5,000 unit capsule Take 1 Cap by mouth daily. 12/9/16   Ting Bustillo MD   CETIRIZINE HCL (ALLER-MARSHALL PO) Take  by mouth. Provider, Historical   ranitidine (ZANTAC) 75 mg tablet Take 150 mg by mouth daily. Provider, Historical   calcium-cholecalciferol, d3, (CALCIUM 600 + D) 600-125 mg-unit tab Take  by mouth. Provider, Historical   folic acid (FOLVITE) 1 mg tablet Take  by mouth daily. Provider, Historical     Patient Active Problem List   Diagnosis Code    Seizures ([facility] [address] R56.9    IgA deficiency ([facility] [address] D80.2    Asthma J45.909    Acne vulgaris L70.0    Dizziness R42    Migraine without aura and without status migrainosus, not intractable G43.009    Family history of thyroid disease in sister - Hashimoto's Z80.51    S/P placement of VNS (vagus nerve stimulation) device Z96.89    Focal nodular hyperplasia of liver K76.89    History of frequent URI Z87.09    Non-intractable vomiting R11.10     Current Outpatient Medications   Medication Sig Dispense Refill    OXcarbazepine (OXTELLAR XR) 600 mg Tb24 Take  by mouth.  perampaneL (FYCOMPA) 6 mg tab tablet Take  by mouth nightly.  immun glob G,IgG,-gly-IgA ov50 (CUVITRU) 8 gram/40 mL (20 %) soln by SubCUTAneous route.  OXcarbazepine (TRILEPTAL) 600 mg tablet Take  by mouth two (2) times a day.  ondansetron (ZOFRAN ODT) 8 mg disintegrating tablet DISSOLVE 1 TABLET ON TONGUE EVERY 12 HOURS AS NEEDED FOR NAUSEA 20 Tab 0    SUMAtriptan (IMITREX) 20 mg/actuation nasal spray INSTILL 1 SPRAY INTO BOTH NOSTRILS AS NEEDED FOR MIGRAINE 6 Container 5    fluticasone (FLONASE ALLERGY RELIEF) 50 mcg/actuation nasal spray 2 Sprays by Both Nostrils route daily.  fluticasone-vilanterol (BREO ELLIPTA) 200-25 mcg/dose inhaler Take 1 Puff by inhalation daily.  albuterol sulfate (PROAIR RESPICLICK) 90 mcg/actuation aepb Take 90 mcg by mouth four (4) times daily.  cholecalciferol (VITAMIN D3) 5,000 unit capsule Take 1 Cap by mouth daily. 90 Cap 3    CETIRIZINE HCL (ALLER-MARSHALL PO) Take  by mouth.  ranitidine (ZANTAC) 75 mg tablet Take 150 mg by mouth daily.  calcium-cholecalciferol, d3, (CALCIUM 600 + D) 600-125 mg-unit tab Take  by mouth.  folic acid (FOLVITE) 1 mg tablet Take  by mouth daily. Allergies   Allergen Reactions    Amoxicillin Hives     Keflex tolerated    Levaquin [Levofloxacin] Seizures     Seizure - lowered seizure threshold    Sulfa (Sulfonamide Antibiotics) Other (comments)     Vision and balance problem while on Vimpat, but had previously tolerated Bactrim well     Past Medical History:   Diagnosis Date    Acne vulgaris     Currently on 2nd course of Acutane, sees Dr. Marleen Cevallos    Asthma     Exercise-induced asthma, may have been from chronic bronchitis; doing well on Advair     Family history of thyroid disease in sister - Hashimoto's     Focal nodular hyperplasia of liver 2018    IgA deficiency ([address]     Initially thought to be secondary to Carbatrol; followed by pediatric allergy/immunology through [facility], has not resolved with med change; every 5 year [id number]    Migraine without aura and without status migrainosus, not intractable 2/14/2017    Nasal bone fracture 01/02/2018    Seizure 1/2/18, LOC, struck nose on bathroom counter, Dr. Rashi Ruiz planning closed reduction     Routine adult health maintenance 4/2/2014 4/2/2014:   -- Diabetes mellitus:  [facility] GTT (1 hour) [facility]  -- Vaccinations: Influenza vaccine recommended annually, patient sees immunologist for IgA deficiency -- Weight:  Body mass index is 21.29 kg/(m^2). Discussed the patient's BMI with her.   The BMI follow up plan is as follows:  Healthy BMI -- Cervical cancer:    Patient to schedule Pap smear with Dr. Raghav Díaz in [place]     Seizures [facility]) 9years old    Absence seizures originally, developed tonic-clonic seizures in 2007; Followed by Dr. John Noel      Past Surgical History:   Procedure Laterality Date    HX OTHER SURGICAL N/A 01/25/2018    VNS placed for intractable seizures [facility]     HX OTHER SURGICAL      Vagus nerve stimulator     HX WISDOM TEETH EXTRACTION  2009     Family History   Problem Relation Age of Onset    Thyroid Disease Sister         Hashimoto's    Heart Disease Maternal Grandmother     Heart Disease Paternal Grandfather     Seizures Father      Social History     Tobacco Use    Smoking status: Never Smoker    Smokeless tobacco: Never Used   Substance Use Topics    Alcohol use: Yes     Comment: 1/wk       Review of Systems   All other systems reviewed and are negative. Objective:   No flowsheet data found. General: alert, cooperative, no distress   Mental  status: normal mood, behavior, speech, dress, motor activity, and thought processes, able to follow commands   HENT: NCAT   Neck: no visualized mass   Resp: no respiratory distress   Neuro: no gross deficits   Skin: no discoloration or lesions of concern on visible areas   Psychiatric: normal affect, consistent with stated mood, no evidence of hallucinations     Additional exam findings: We discussed the expected course, resolution and complications of the diagnosis(es) in detail. Medication risks, benefits, costs, interactions, and alternatives were discussed as indicated. I advised her to contact the office if her condition worsens, changes or fails to improve as anticipated. She expressed understanding with the diagnosis(es) and plan. Olivia Lou, who was evaluated through a patient-initiated, synchronous (real-time) audio-video encounter, and/or her healthcare decision maker, is aware that it is a billable service, with coverage as determined by her insurance carrier. She provided verbal consent to proceed: Yes, and patient identification was verified.  It was conducted pursuant to the emergency declaration under the [facility] waiver authority and the [employer]. A caregiver was present when appropriate. Ability to conduct physical exam was limited. I was in the office. The patient was at home.       Patricia Olivarez MD